# Patient Record
Sex: FEMALE | Race: BLACK OR AFRICAN AMERICAN | NOT HISPANIC OR LATINO | URBAN - METROPOLITAN AREA
[De-identification: names, ages, dates, MRNs, and addresses within clinical notes are randomized per-mention and may not be internally consistent; named-entity substitution may affect disease eponyms.]

---

## 2017-03-20 ENCOUNTER — OUTPATIENT (OUTPATIENT)
Dept: OUTPATIENT SERVICES | Facility: HOSPITAL | Age: 42
LOS: 1 days | End: 2017-03-20
Payer: COMMERCIAL

## 2017-03-20 ENCOUNTER — APPOINTMENT (OUTPATIENT)
Dept: NEUROLOGY | Facility: CLINIC | Age: 42
End: 2017-03-20

## 2017-03-20 PROCEDURE — 70553 MRI BRAIN STEM W/O & W/DYE: CPT | Mod: 26

## 2017-03-20 PROCEDURE — A9585: CPT

## 2017-03-20 PROCEDURE — 70553 MRI BRAIN STEM W/O & W/DYE: CPT

## 2017-03-29 ENCOUNTER — EMERGENCY (EMERGENCY)
Facility: HOSPITAL | Age: 42
LOS: 1 days | Discharge: HOME | End: 2017-03-29
Attending: EMERGENCY MEDICINE
Payer: MEDICAID

## 2017-03-29 VITALS
WEIGHT: 235.89 LBS | RESPIRATION RATE: 18 BRPM | TEMPERATURE: 98 F | HEIGHT: 61 IN | SYSTOLIC BLOOD PRESSURE: 134 MMHG | HEART RATE: 61 BPM | OXYGEN SATURATION: 100 % | DIASTOLIC BLOOD PRESSURE: 87 MMHG

## 2017-03-29 DIAGNOSIS — I10 ESSENTIAL (PRIMARY) HYPERTENSION: ICD-10-CM
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
## 2017-03-29 DIAGNOSIS — Z22.322 CARRIER OR SUSPECTED CARRIER OF METHICILLIN RESISTANT STAPHYLOCOCCUS AUREUS: ICD-10-CM

## 2017-03-29 DIAGNOSIS — S61.219A LACERATION WITHOUT FOREIGN BODY OF UNSPECIFIED FINGER WITHOUT DAMAGE TO NAIL, INITIAL ENCOUNTER: ICD-10-CM
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
## 2017-03-29 DIAGNOSIS — J45.909 UNSPECIFIED ASTHMA, UNCOMPLICATED: ICD-10-CM

## 2017-03-29 DIAGNOSIS — I82.90 ACUTE EMBOLISM AND THROMBOSIS OF UNSPECIFIED VEIN: ICD-10-CM

## 2017-03-29 PROCEDURE — 99284 EMERGENCY DEPT VISIT MOD MDM: CPT

## 2017-03-29 PROCEDURE — 99283 EMERGENCY DEPT VISIT LOW MDM: CPT | Mod: 25

## 2017-03-29 PROCEDURE — 90471 IMMUNIZATION ADMIN: CPT

## 2017-03-29 PROCEDURE — 90715 TDAP VACCINE 7 YRS/> IM: CPT

## 2017-03-29 RX ORDER — TETANUS TOXOID, REDUCED DIPHTHERIA TOXOID AND ACELLULAR PERTUSSIS VACCINE, ADSORBED 5; 2.5; 8; 8; 2.5 [IU]/.5ML; [IU]/.5ML; UG/.5ML; UG/.5ML; UG/.5ML
0.5 SUSPENSION INTRAMUSCULAR ONCE
Qty: 0 | Refills: 0 | Status: COMPLETED | OUTPATIENT
Start: 2017-03-29 | End: 2017-03-29

## 2017-03-29 RX ADMIN — TETANUS TOXOID, REDUCED DIPHTHERIA TOXOID AND ACELLULAR PERTUSSIS VACCINE, ADSORBED 0.5 MILLILITER(S): 5; 2.5; 8; 8; 2.5 SUSPENSION INTRAMUSCULAR at 21:25

## 2017-03-29 NOTE — ED PROVIDER NOTE - PMH
Asthma  required hospital admission in the past.  Does not take medications at home.  Hypertension    MRSA (methicillin resistant Staphylococcus aureus)    NSTEMI (non-ST elevated myocardial infarction)  October 2014, treated at [facility].  Cardiac cath performed.  Thrombus

## 2017-03-29 NOTE — ED PROVIDER NOTE - PROGRESS NOTE DETAILS
Explained to patient wound care and need to follow up with PMD in 2-3 days. Pt is well appearing walking with steady gait, stable for discharge and follow up without fail with medical doctor. I had a detailed discussion with the patient and/or guardian regarding the historical points, exam findings, and any diagnostic results supporting the discharge diagnosis. Pt educated on care and need for follow up. Strict return instructions and red flag signs and symptoms discussed with patient. Questions answered. Pt shows understanding of discharge information and agrees to follow.

## 2017-03-29 NOTE — ED PROVIDER NOTE - ATTENDING CONTRIBUTION TO CARE
40 yo F sustained left 3rd finger laceration 4 days ago.  left 3rd digit distal phalanx volar side 2 cm x 1 cm "V" shaped laceration with skin flap  distal radial and ulnar pulses intact, cap refill < 2 seconds, full range of motion of arm +elbow flexion/extension/supination and pronation intact, +flexion and extension of all digits at DIP and PIP. No active bleeding.   Pt is well appearing walking with normal gait, stable for discharge and follow up with medical doctor. Pt educated on care and need for follow up. Discussed anticipatory guidance and return precautions. Questions answered. I had a detailed discussion with the patient and/or guardian regarding the historical points, exam findings, and any diagnostic results supporting the discharge diagnosis.   Pt is neurovascularly intact and  clean dressing applied.

## 2017-03-29 NOTE — ED ADULT TRIAGE NOTE - CHIEF COMPLAINT QUOTE
patient reports cutting honeydew last sunday and accidentally cut her L middle finger. Patient unknown tdap

## 2017-03-29 NOTE — ED PROVIDER NOTE - PHYSICAL EXAMINATION
left hand 3rd digit distal phalanx volar side 2 cm x 1 cm "V" shaped laceration with skin flap. Well-demarcated edges, no erythema/swelling/streaking. 2nd digit distal phalanx volar surface 3 mm skin avulsion.

## 2017-03-29 NOTE — ED PROVIDER NOTE - MEDICAL DECISION MAKING DETAILS
41 year-old female, presents with laceration to left hand x 4 days ago. No signs of infection. Unable to repair lac due to timing of injury. Patient will need to follow up with PMD for follow up.

## 2017-03-29 NOTE — ED PROVIDER NOTE - OBJECTIVE STATEMENT
41 year-old female, history of left ventricular thrombus s/p open heart surgery on Warfarin, seizure, presents with cc left hand 2nd and 3rd digits laceration 4 days ago. Reports that was slicing fruit and accidently had fingers. Presents today to make sure "everything is OK". Denies fever, chills, drainage, bleeding, redness, swelling or any other complaints.

## 2017-03-29 NOTE — ED ADULT NURSE NOTE - OBJECTIVE STATEMENT
Pt a+ox3 w/ c/o L 2nd digit avulsion and L 3rd digit laceration s/p cut self while cutting fruit 3 days ago, no bleeding or neurological deficit noted on assessment, 5/10 pain scale.

## 2017-05-25 ENCOUNTER — RESULT REVIEW (OUTPATIENT)
Age: 42
End: 2017-05-25

## 2018-01-24 ENCOUNTER — RESULT REVIEW (OUTPATIENT)
Age: 43
End: 2018-01-24

## 2019-06-12 ENCOUNTER — INPATIENT (INPATIENT)
Facility: HOSPITAL | Age: 44
LOS: 5 days | Discharge: HOME | End: 2019-06-18
Attending: HOSPITALIST | Admitting: HOSPITALIST
Payer: SELF-PAY

## 2019-06-12 VITALS — OXYGEN SATURATION: 97 % | HEART RATE: 108 BPM

## 2019-06-12 DIAGNOSIS — G40.909 EPILEPSY, UNSPECIFIED, NOT INTRACTABLE, WITHOUT STATUS EPILEPTICUS: ICD-10-CM

## 2019-06-12 LAB
ALBUMIN SERPL ELPH-MCNC: 4.7 G/DL — SIGNIFICANT CHANGE UP (ref 3.3–5)
ALP SERPL-CCNC: 87 U/L — SIGNIFICANT CHANGE UP (ref 40–120)
ALT FLD-CCNC: 18 U/L — SIGNIFICANT CHANGE UP (ref 4–33)
AMPHET UR-MCNC: NEGATIVE — SIGNIFICANT CHANGE UP
ANION GAP SERPL CALC-SCNC: 34 MMO/L — HIGH (ref 7–14)
ANISOCYTOSIS BLD QL: SLIGHT — SIGNIFICANT CHANGE UP
APAP SERPL-MCNC: < 15 UG/ML — LOW (ref 15–25)
APTT BLD: 27.9 SEC — SIGNIFICANT CHANGE UP (ref 27.5–36.3)
AST SERPL-CCNC: 30 U/L — SIGNIFICANT CHANGE UP (ref 4–32)
BARBITURATES UR SCN-MCNC: NEGATIVE — SIGNIFICANT CHANGE UP
BASE EXCESS BLDA CALC-SCNC: -12.8 MMOL/L — SIGNIFICANT CHANGE UP
BASE EXCESS BLDA CALC-SCNC: -8.4 MMOL/L — SIGNIFICANT CHANGE UP
BASE EXCESS BLDV CALC-SCNC: -22.7 MMOL/L — SIGNIFICANT CHANGE UP
BASOPHILS # BLD AUTO: 0.03 K/UL — SIGNIFICANT CHANGE UP (ref 0–0.2)
BASOPHILS NFR BLD AUTO: 0.3 % — SIGNIFICANT CHANGE UP (ref 0–2)
BASOPHILS NFR SPEC: 0 % — SIGNIFICANT CHANGE UP (ref 0–2)
BILIRUB SERPL-MCNC: < 0.2 MG/DL — LOW (ref 0.2–1.2)
BLASTS # FLD: 0 % — SIGNIFICANT CHANGE UP (ref 0–0)
BLD GP AB SCN SERPL QL: NEGATIVE — SIGNIFICANT CHANGE UP
BLOOD GAS VENOUS - CREATININE: 1.18 MG/DL — SIGNIFICANT CHANGE UP (ref 0.5–1.3)
BLOOD GAS VENOUS - FIO2: 20 — SIGNIFICANT CHANGE UP
BUN SERPL-MCNC: 13 MG/DL — SIGNIFICANT CHANGE UP (ref 7–23)
CALCIUM SERPL-MCNC: 10.6 MG/DL — HIGH (ref 8.4–10.5)
CHLORIDE BLDA-SCNC: 110 MMOL/L — HIGH (ref 96–108)
CHLORIDE BLDA-SCNC: 114 MMOL/L — HIGH (ref 96–108)
CHLORIDE BLDV-SCNC: 113 MMOL/L — HIGH (ref 96–108)
CHLORIDE SERPL-SCNC: 99 MMOL/L — SIGNIFICANT CHANGE UP (ref 98–107)
CK SERPL-CCNC: 114 U/L — SIGNIFICANT CHANGE UP (ref 25–170)
CO2 SERPL-SCNC: < 5 MMOL/L — CRITICAL LOW (ref 22–31)
COCAINE METAB.OTHER UR-MCNC: NEGATIVE — SIGNIFICANT CHANGE UP
CREAT SERPL-MCNC: 1.08 MG/DL — SIGNIFICANT CHANGE UP (ref 0.5–1.3)
EOSINOPHIL # BLD AUTO: 0.18 K/UL — SIGNIFICANT CHANGE UP (ref 0–0.5)
EOSINOPHIL NFR BLD AUTO: 2 % — SIGNIFICANT CHANGE UP (ref 0–6)
EOSINOPHIL NFR FLD: 1.8 % — SIGNIFICANT CHANGE UP (ref 0–6)
ETHANOL BLD-MCNC: < 10 MG/DL — SIGNIFICANT CHANGE UP
GAS PNL BLDV: 135 MMOL/L — LOW (ref 136–146)
GIANT PLATELETS BLD QL SMEAR: PRESENT — SIGNIFICANT CHANGE UP
GLUCOSE BLDA-MCNC: 137 MG/DL — HIGH (ref 70–99)
GLUCOSE BLDA-MCNC: 183 MG/DL — HIGH (ref 70–99)
GLUCOSE BLDV-MCNC: 201 MG/DL — HIGH (ref 70–99)
GLUCOSE SERPL-MCNC: 206 MG/DL — HIGH (ref 70–99)
HCG SERPL-ACNC: < 5 MIU/ML — SIGNIFICANT CHANGE UP
HCG SERPL-ACNC: < 5 MIU/ML — SIGNIFICANT CHANGE UP
HCO3 BLDA-SCNC: 13 MMOL/L — LOW (ref 22–26)
HCO3 BLDA-SCNC: 17 MMOL/L — LOW (ref 22–26)
HCO3 BLDV-SCNC: 6 MMOL/L — LOW (ref 20–27)
HCT VFR BLD CALC: 53.6 % — HIGH (ref 34.5–45)
HCT VFR BLDA CALC: 41.7 % — SIGNIFICANT CHANGE UP (ref 34.5–46.5)
HCT VFR BLDA CALC: 45.2 % — SIGNIFICANT CHANGE UP (ref 34.5–46.5)
HCT VFR BLDV CALC: 47.8 % — HIGH (ref 34.5–45)
HGB BLD-MCNC: 15.2 G/DL — SIGNIFICANT CHANGE UP (ref 11.5–15.5)
HGB BLDA-MCNC: 13.6 G/DL — SIGNIFICANT CHANGE UP (ref 11.5–15.5)
HGB BLDA-MCNC: 14.8 G/DL — SIGNIFICANT CHANGE UP (ref 11.5–15.5)
HGB BLDV-MCNC: 15.6 G/DL — HIGH (ref 11.5–15.5)
IMM GRANULOCYTES NFR BLD AUTO: 0.4 % — SIGNIFICANT CHANGE UP (ref 0–1.5)
INR BLD: 0.97 — SIGNIFICANT CHANGE UP (ref 0.88–1.17)
LACTATE BLDA-SCNC: 2.5 MMOL/L — HIGH (ref 0.5–2)
LACTATE BLDA-SCNC: 7.3 MMOL/L — CRITICAL HIGH (ref 0.5–2)
LACTATE BLDV-MCNC: 21 MMOL/L — CRITICAL HIGH (ref 0.5–2)
LIDOCAIN IGE QN: 124.3 U/L — HIGH (ref 7–60)
LYMPHOCYTES # BLD AUTO: 6.52 K/UL — HIGH (ref 1–3.3)
LYMPHOCYTES # BLD AUTO: 71.7 % — HIGH (ref 13–44)
LYMPHOCYTES NFR SPEC AUTO: 69.9 % — HIGH (ref 13–44)
MACROCYTES BLD QL: SLIGHT — SIGNIFICANT CHANGE UP
MCHC RBC-ENTMCNC: 24.9 PG — LOW (ref 27–34)
MCHC RBC-ENTMCNC: 28.4 % — LOW (ref 32–36)
MCV RBC AUTO: 87.7 FL — SIGNIFICANT CHANGE UP (ref 80–100)
METAMYELOCYTES # FLD: 0.9 % — SIGNIFICANT CHANGE UP (ref 0–1)
METHADONE UR-MCNC: NEGATIVE — SIGNIFICANT CHANGE UP
MONOCYTES # BLD AUTO: 0.27 K/UL — SIGNIFICANT CHANGE UP (ref 0–0.9)
MONOCYTES NFR BLD AUTO: 3 % — SIGNIFICANT CHANGE UP (ref 2–14)
MONOCYTES NFR BLD: 4.4 % — SIGNIFICANT CHANGE UP (ref 2–9)
MYELOCYTES NFR BLD: 0 % — SIGNIFICANT CHANGE UP (ref 0–0)
NEUTROPHIL AB SER-ACNC: 17.7 % — LOW (ref 43–77)
NEUTROPHILS # BLD AUTO: 2.05 K/UL — SIGNIFICANT CHANGE UP (ref 1.8–7.4)
NEUTROPHILS NFR BLD AUTO: 22.6 % — LOW (ref 43–77)
NEUTS BAND # BLD: 0 % — SIGNIFICANT CHANGE UP (ref 0–6)
NRBC # FLD: 0 K/UL — SIGNIFICANT CHANGE UP (ref 0–0)
NT-PROBNP SERPL-SCNC: 486.6 PG/ML — SIGNIFICANT CHANGE UP
OPIATES UR-MCNC: NEGATIVE — SIGNIFICANT CHANGE UP
OTHER - HEMATOLOGY %: 0 — SIGNIFICANT CHANGE UP
OXYCODONE UR-MCNC: NEGATIVE — SIGNIFICANT CHANGE UP
PCO2 BLDA: 44 MMHG — SIGNIFICANT CHANGE UP (ref 32–48)
PCO2 BLDA: 61 MMHG — HIGH (ref 32–48)
PCO2 BLDV: 63 MMHG — HIGH (ref 41–51)
PCP UR-MCNC: NEGATIVE — SIGNIFICANT CHANGE UP
PH BLDA: 7.05 PH — CRITICAL LOW (ref 7.35–7.45)
PH BLDA: 7.23 PH — LOW (ref 7.35–7.45)
PH BLDV: < 6.81 PH — CRITICAL LOW (ref 7.32–7.43)
PLATELET # BLD AUTO: 271 K/UL — SIGNIFICANT CHANGE UP (ref 150–400)
PLATELET COUNT - ESTIMATE: NORMAL — SIGNIFICANT CHANGE UP
PMV BLD: 13.9 FL — HIGH (ref 7–13)
PO2 BLDA: 126 MMHG — HIGH (ref 83–108)
PO2 BLDA: 132 MMHG — HIGH (ref 83–108)
PO2 BLDV: 56 MMHG — HIGH (ref 35–40)
POIKILOCYTOSIS BLD QL AUTO: SLIGHT — SIGNIFICANT CHANGE UP
POLYCHROMASIA BLD QL SMEAR: SLIGHT — SIGNIFICANT CHANGE UP
POTASSIUM BLDA-SCNC: 5 MMOL/L — HIGH (ref 3.4–4.5)
POTASSIUM BLDA-SCNC: 5.4 MMOL/L — HIGH (ref 3.4–4.5)
POTASSIUM BLDV-SCNC: 5.3 MMOL/L — HIGH (ref 3.4–4.5)
POTASSIUM SERPL-MCNC: 4.8 MMOL/L — SIGNIFICANT CHANGE UP (ref 3.5–5.3)
POTASSIUM SERPL-SCNC: 4.8 MMOL/L — SIGNIFICANT CHANGE UP (ref 3.5–5.3)
PROMYELOCYTES # FLD: 0 % — SIGNIFICANT CHANGE UP (ref 0–0)
PROT SERPL-MCNC: 8.1 G/DL — SIGNIFICANT CHANGE UP (ref 6–8.3)
PROTHROM AB SERPL-ACNC: 11.1 SEC — SIGNIFICANT CHANGE UP (ref 9.8–13.1)
RBC # BLD: 6.11 M/UL — HIGH (ref 3.8–5.2)
RBC # FLD: 17.4 % — HIGH (ref 10.3–14.5)
RH IG SCN BLD-IMP: POSITIVE — SIGNIFICANT CHANGE UP
SALICYLATES SERPL-MCNC: < 5 MG/DL — LOW (ref 15–30)
SAO2 % BLDA: 95.7 % — SIGNIFICANT CHANGE UP (ref 95–99)
SAO2 % BLDA: 97.7 % — SIGNIFICANT CHANGE UP (ref 95–99)
SAO2 % BLDV: 53.7 % — LOW (ref 60–85)
SMUDGE CELLS # BLD: PRESENT — SIGNIFICANT CHANGE UP
SODIUM BLDA-SCNC: 130 MMOL/L — LOW (ref 136–146)
SODIUM BLDA-SCNC: 133 MMOL/L — LOW (ref 136–146)
SODIUM SERPL-SCNC: 138 MMOL/L — SIGNIFICANT CHANGE UP (ref 135–145)
TROPONIN T, HIGH SENSITIVITY: 26 NG/L — SIGNIFICANT CHANGE UP (ref ?–14)
VARIANT LYMPHS # BLD: 5.3 % — SIGNIFICANT CHANGE UP
WBC # BLD: 9.09 K/UL — SIGNIFICANT CHANGE UP (ref 3.8–10.5)
WBC # FLD AUTO: 9.09 K/UL — SIGNIFICANT CHANGE UP (ref 3.8–10.5)

## 2019-06-12 PROCEDURE — 71045 X-RAY EXAM CHEST 1 VIEW: CPT | Mod: 26

## 2019-06-12 PROCEDURE — 74177 CT ABD & PELVIS W/CONTRAST: CPT | Mod: 26

## 2019-06-12 PROCEDURE — 71260 CT THORAX DX C+: CPT | Mod: 26

## 2019-06-12 PROCEDURE — 76604 US EXAM CHEST: CPT | Mod: 26,GC

## 2019-06-12 PROCEDURE — 72125 CT NECK SPINE W/O DYE: CPT | Mod: 26

## 2019-06-12 PROCEDURE — 93308 TTE F-UP OR LMTD: CPT | Mod: 26,GC

## 2019-06-12 PROCEDURE — 70450 CT HEAD/BRAIN W/O DYE: CPT | Mod: 26

## 2019-06-12 PROCEDURE — 99291 CRITICAL CARE FIRST HOUR: CPT

## 2019-06-12 RX ORDER — MIDAZOLAM HYDROCHLORIDE 1 MG/ML
0.02 INJECTION, SOLUTION INTRAMUSCULAR; INTRAVENOUS
Qty: 100 | Refills: 0 | Status: DISCONTINUED | OUTPATIENT
Start: 2019-06-12 | End: 2019-06-14

## 2019-06-12 RX ORDER — FENTANYL CITRATE 50 UG/ML
1.5 INJECTION INTRAVENOUS
Qty: 2500 | Refills: 0 | Status: DISCONTINUED | OUTPATIENT
Start: 2019-06-12 | End: 2019-06-14

## 2019-06-12 RX ORDER — LEVETIRACETAM 250 MG/1
1000 TABLET, FILM COATED ORAL ONCE
Refills: 0 | Status: COMPLETED | OUTPATIENT
Start: 2019-06-12 | End: 2019-06-12

## 2019-06-12 RX ORDER — MIDAZOLAM HYDROCHLORIDE 1 MG/ML
4 INJECTION, SOLUTION INTRAMUSCULAR; INTRAVENOUS ONCE
Refills: 0 | Status: DISCONTINUED | OUTPATIENT
Start: 2019-06-12 | End: 2019-06-12

## 2019-06-12 RX ORDER — HEPARIN SODIUM 5000 [USP'U]/ML
5000 INJECTION INTRAVENOUS; SUBCUTANEOUS EVERY 8 HOURS
Refills: 0 | Status: DISCONTINUED | OUTPATIENT
Start: 2019-06-12 | End: 2019-06-13

## 2019-06-12 RX ORDER — FENTANYL CITRATE 50 UG/ML
100 INJECTION INTRAVENOUS ONCE
Refills: 0 | Status: DISCONTINUED | OUTPATIENT
Start: 2019-06-12 | End: 2019-06-12

## 2019-06-12 RX ORDER — MIDAZOLAM HYDROCHLORIDE 1 MG/ML
0.02 INJECTION, SOLUTION INTRAMUSCULAR; INTRAVENOUS
Qty: 100 | Refills: 0 | Status: DISCONTINUED | OUTPATIENT
Start: 2019-06-12 | End: 2019-06-12

## 2019-06-12 RX ORDER — PROPOFOL 10 MG/ML
20 INJECTION, EMULSION INTRAVENOUS
Qty: 1000 | Refills: 0 | Status: DISCONTINUED | OUTPATIENT
Start: 2019-06-12 | End: 2019-06-12

## 2019-06-12 RX ORDER — ROCURONIUM BROMIDE 10 MG/ML
100 VIAL (ML) INTRAVENOUS ONCE
Refills: 0 | Status: COMPLETED | OUTPATIENT
Start: 2019-06-12 | End: 2019-06-12

## 2019-06-12 RX ORDER — RAMIPRIL 5 MG
1 CAPSULE ORAL
Qty: 0 | Refills: 0 | DISCHARGE

## 2019-06-12 RX ORDER — CHLORHEXIDINE GLUCONATE 213 G/1000ML
1 SOLUTION TOPICAL
Refills: 0 | Status: DISCONTINUED | OUTPATIENT
Start: 2019-06-12 | End: 2019-06-14

## 2019-06-12 RX ORDER — PROPOFOL 10 MG/ML
18.25 INJECTION, EMULSION INTRAVENOUS
Qty: 1000 | Refills: 0 | Status: DISCONTINUED | OUTPATIENT
Start: 2019-06-12 | End: 2019-06-13

## 2019-06-12 RX ORDER — MIDAZOLAM HYDROCHLORIDE 1 MG/ML
2 INJECTION, SOLUTION INTRAMUSCULAR; INTRAVENOUS ONCE
Refills: 0 | Status: DISCONTINUED | OUTPATIENT
Start: 2019-06-12 | End: 2019-06-12

## 2019-06-12 RX ORDER — ETOMIDATE 2 MG/ML
20 INJECTION INTRAVENOUS ONCE
Refills: 0 | Status: COMPLETED | OUTPATIENT
Start: 2019-06-12 | End: 2019-06-12

## 2019-06-12 RX ADMIN — FENTANYL CITRATE 100 MICROGRAM(S): 50 INJECTION INTRAVENOUS at 18:30

## 2019-06-12 RX ADMIN — LEVETIRACETAM 400 MILLIGRAM(S): 250 TABLET, FILM COATED ORAL at 20:16

## 2019-06-12 RX ADMIN — FENTANYL CITRATE 100 MICROGRAM(S): 50 INJECTION INTRAVENOUS at 23:00

## 2019-06-12 RX ADMIN — ETOMIDATE 20 MILLIGRAM(S): 2 INJECTION INTRAVENOUS at 18:30

## 2019-06-12 RX ADMIN — MIDAZOLAM HYDROCHLORIDE 2.19 MG/KG/HR: 1 INJECTION, SOLUTION INTRAMUSCULAR; INTRAVENOUS at 22:51

## 2019-06-12 RX ADMIN — HEPARIN SODIUM 5000 UNIT(S): 5000 INJECTION INTRAVENOUS; SUBCUTANEOUS at 23:19

## 2019-06-12 RX ADMIN — Medication 100 MILLIGRAM(S): at 18:30

## 2019-06-12 RX ADMIN — MIDAZOLAM HYDROCHLORIDE 4 MILLIGRAM(S): 1 INJECTION, SOLUTION INTRAMUSCULAR; INTRAVENOUS at 22:12

## 2019-06-12 RX ADMIN — PROPOFOL 12 MICROGRAM(S)/KG/MIN: 10 INJECTION, EMULSION INTRAVENOUS at 20:01

## 2019-06-12 RX ADMIN — PROPOFOL 12 MICROGRAM(S)/KG/MIN: 10 INJECTION, EMULSION INTRAVENOUS at 22:53

## 2019-06-12 RX ADMIN — MIDAZOLAM HYDROCHLORIDE 4 MILLIGRAM(S): 1 INJECTION, SOLUTION INTRAMUSCULAR; INTRAVENOUS at 23:00

## 2019-06-12 RX ADMIN — MIDAZOLAM HYDROCHLORIDE 2 MILLIGRAM(S): 1 INJECTION, SOLUTION INTRAMUSCULAR; INTRAVENOUS at 22:30

## 2019-06-12 NOTE — H&P ADULT - NSICDXPASTMEDICALHX_GEN_ALL_CORE_FT
PAST MEDICAL HISTORY:  Asthma required hospital admission in the past.  Does not take medications at home.    Hypertension     NSTEMI (non-ST elevated myocardial infarction) October 2014, treated at [facility].  Cardiac cath performed.    Seizure disorder     Thrombus LV thrombus s/p emergent thrombectomy
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
## 2019-06-12 NOTE — H&P ADULT - NSHPLABSRESULTS_GEN_ALL_CORE
The Labs were reviewed by me   The Radiology was reviewed by me    EKG tracing reviewed by me    06-12    138  |  99  |  13  ----------------------------<  206<H>  4.8   |  < 5<LL>  |  1.08    Ca    10.6<H>      12 Jun 2019 18:00    TPro  8.1  /  Alb  4.7  /  TBili  < 0.2<L>  /  DBili  x   /  AST  30  /  ALT  18  /  AlkPhos  87  06-12          PT/INR - ( 12 Jun 2019 19:18 )   PT: 11.1 SEC;   INR: 0.97          PTT - ( 12 Jun 2019 19:18 )  PTT:27.9 SEC                ABG - ( 12 Jun 2019 20:55 )  pH, Arterial: 7.23  pH, Blood: x     /  pCO2: 44    /  pO2: 132   / HCO3: 17    / Base Excess: -8.4  /  SaO2: 97.7                                    15.2   9.09  )-----------( 271      ( 12 Jun 2019 18:00 )             53.6     CAPILLARY BLOOD GLUCOSE

## 2019-06-12 NOTE — PATIENT PROFILE ADULT - LIVES WITH
patient unable to answer, intubated and sedated adult child(shlomo)/patient unable to answer, intubated and sedated
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
## 2019-06-12 NOTE — PATIENT PROFILE ADULT - NSPROGENDIFFINTUB_GEN_A_NUR
patient unable to answer, intubated and sedated patient unable to answer, intubated and sedated, daughter has never seen her intubated

## 2019-06-12 NOTE — ED PROVIDER NOTE - PHYSICAL EXAMINATION
***GEN - agitated, combative  ***HEAD - NC/AT  ***EYES/NOSE - PEERL,mucous membranes moist, no discharge   ***THROAT: Oral cavity and pharynx normal. scant blood on lower lip  ***NECK: supple, c-collar in place  ***PULMONARY - coarse breath sounds diffusely, equal bilaterally  ***CARDIAC- s1s2, RRR, no murmur  ***ABDOMEN - +BS, ND, NT, soft, no guarding, no rebound, no organomegaly  ***BACK - no CVA tenderness, Normal  spine  ***EXTREMITIES - symmetric pulses, 2+ dp, capillary refill < 2 seconds, no clubbing, no cyanosis, no edema   ***SKIN - warm, diaphoretic, intact, no rash or bruising   ***NEUROLOGIC - aox1, responding intermittently to questions, moving all extremities equally, moaning

## 2019-06-12 NOTE — H&P ADULT - NSICDXFAMILYHX_GEN_ALL_CORE_FT
FAMILY HISTORY:  Family history of coronary artery disease in father    Uncle  Still living? Unknown  Family history of myocardial infarction at age less than 60, Age at diagnosis: Age Unknown

## 2019-06-12 NOTE — ED ADULT NURSE REASSESSMENT NOTE - NS ED NURSE REASSESS COMMENT FT1
Pt intubated at this time, on Propofol drip, tolerating intubation well. Respirations even and unlabored. TARIQ Arce at bedside closely monitoring patient, MD at bedside, will continue to monitor.

## 2019-06-12 NOTE — PATIENT PROFILE ADULT - NSPROGENBLOODRESTRICT_GEN_A_NUR
patient unable to answer, intubated and sedated patient unable to answer, intubated and sedated/none

## 2019-06-12 NOTE — H&P ADULT - NSHPSOCIALHISTORY_GEN_ALL_CORE
Smoking: current smoker  Alcohol: Smoking: current smoker  Employment: started working as a  in May 2019  Lives w/ daughter at home
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
## 2019-06-12 NOTE — ED PROVIDER NOTE - CRITICAL CARE PROVIDED
interpretation of diagnostic studies/consultation with other physicians/consult w/ pt's family directly relating to pts condition/additional history taking/direct patient care (not related to procedure)/documentation/telephone consultation with the patient's family
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
## 2019-06-12 NOTE — ED PROVIDER NOTE - PMH
Asthma  required hospital admission in the past.  Does not take medications at home.  Hypertension    MRSA (methicillin resistant Staphylococcus aureus)    NSTEMI (non-ST elevated myocardial infarction)  October 2014, treated at [facility].  Cardiac cath performed.  Thrombus
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
## 2019-06-12 NOTE — ED ADULT NURSE NOTE - PMH
Asthma  required hospital admission in the past.  Does not take medications at home.  Hypertension    MRSA (methicillin resistant Staphylococcus aureus)    NSTEMI (non-ST elevated myocardial infarction)  October 2014, treated at [facility].  Cardiac cath performed.  Thrombus

## 2019-06-12 NOTE — PATIENT PROFILE ADULT - INFORMATION PROVIDED TO:
patient unable to answer, intubated and sedated patient unable to answer, intubated and sedated/patient representative

## 2019-06-12 NOTE — ED PROVIDER NOTE - ATTENDING CONTRIBUTION TO CARE
seizures since 2016  Keppra 1000 BID  carvedilol 12.5mg  used to take coumadin - ?still taking  ramipril   amlodipine      PMD: Dr. Pura Russell  Neurology: Dr. Ayesha Marks  Pharmacy: [facility], [id number]    Daughter: Tierra [phone] (lives with patient)      Patient reported feeling "off" since earlier today - +nausea, dizzy, lightheaded, +vomit

## 2019-06-12 NOTE — PATIENT PROFILE ADULT - NSPROGENANESREACTION_GEN_A_NUR
patient unable to answer, intubated and sedated no previous reaction/patient unable to answer, intubated and sedated

## 2019-06-12 NOTE — H&P ADULT - NSHPREVIEWOFSYSTEMS_GEN_ALL_CORE
CONSTITUTIONAL:  No weight loss, fever, chills, weakness or fatigue.  HEENT:  Eyes:  No visual loss, blurred vision, double vision or yellow sclerae. Ears, Nose, Throat:  No hearing loss, sneezing, congestion, runny nose or sore throat.  SKIN:  No rash or itching.  CARDIOVASCULAR:  No chest pain, chest pressure or chest discomfort. No palpitations or edema.  RESPIRATORY:  No shortness of breath, cough or sputum.  GASTROINTESTINAL:  No anorexia, nausea, vomiting or diarrhea. No abdominal pain or blood.  GENITOURINARY:  Denies hematuria, dysuria.   NEUROLOGICAL:  No headache, dizziness, syncope, paralysis, ataxia, numbness or tingling in the extremities. No change in bowel or bladder control.  MUSCULOSKELETAL:  No muscle, back pain, joint pain or stiffness.  HEMATOLOGIC:  No anemia, bleeding or bruising.  LYMPHATICS:  No enlarged nodes. No history of splenectomy.  PSYCHIATRIC:  No history of depression or anxiety.  ENDOCRINOLOGIC:  No reports of sweating, cold or heat intolerance. No polyuria or polydipsia.  ALLERGIES:  No history of asthma, hives, eczema or rhinitis. Unable to assess as patient is intubated
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
## 2019-06-12 NOTE — H&P ADULT - NSHPPHYSICALEXAM_GEN_ALL_CORE
PHYSICAL EXAM:  GENERAL: NAD, well-developed  HEAD:  Atraumatic, Normocephalic  EYES: EOMI, PERRLA, conjunctiva and sclera clear  NECK: Supple, No JVD  CHEST/LUNG: Clear to auscultation bilaterally; No wheeze  HEART: Regular rate and rhythm; No murmurs, rubs, or gallops  ABDOMEN: Soft, Nontender, Nondistended; Bowel sounds present  EXTREMITIES:  2+ Peripheral Pulses, No clubbing, cyanosis, or edema  PSYCH: AAOx3  NEUROLOGY: non-focal  SKIN: No rashes or lesions PHYSICAL EXAM:  GENERAL: intubated, sedated  HEAD:  Atraumatic, Normocephalic, c-collar in place  EYES: EOMI, PERRLA, conjunctiva and sclera clear, pupils constricted but reactive to light b/l   NECK: Supple, No JVD  CHEST/LUNG: surgical scar above sternum, clear to auscultation bilaterally; No wheeze  HEART: Regular rate and rhythm; No murmurs, rubs, or gallops  ABDOMEN: Soft, Nontender, Nondistended; Bowel sounds present  EXTREMITIES:  2+ Peripheral Pulses, No clubbing, cyanosis, or edema  PSYCH: AAOx0  NEUROLOGY: non-focal  SKIN: No rashes or lesions

## 2019-06-13 LAB
ALBUMIN SERPL ELPH-MCNC: 3.6 G/DL — SIGNIFICANT CHANGE UP (ref 3.3–5)
ALP SERPL-CCNC: 50 U/L — SIGNIFICANT CHANGE UP (ref 40–120)
ALT FLD-CCNC: 19 U/L — SIGNIFICANT CHANGE UP (ref 4–33)
ANION GAP SERPL CALC-SCNC: 13 MMO/L — SIGNIFICANT CHANGE UP (ref 7–14)
ANION GAP SERPL CALC-SCNC: 13 MMO/L — SIGNIFICANT CHANGE UP (ref 7–14)
APPEARANCE UR: SIGNIFICANT CHANGE UP
AST SERPL-CCNC: 41 U/L — HIGH (ref 4–32)
BASE EXCESS BLDA CALC-SCNC: -6.9 MMOL/L — SIGNIFICANT CHANGE UP
BASOPHILS # BLD AUTO: 0.02 K/UL — SIGNIFICANT CHANGE UP (ref 0–0.2)
BASOPHILS NFR BLD AUTO: 0.2 % — SIGNIFICANT CHANGE UP (ref 0–2)
BENZODIAZ UR-MCNC: POSITIVE — SIGNIFICANT CHANGE UP
BILIRUB SERPL-MCNC: 0.3 MG/DL — SIGNIFICANT CHANGE UP (ref 0.2–1.2)
BILIRUB UR-MCNC: NEGATIVE — SIGNIFICANT CHANGE UP
BLOOD GAS ARTERIAL - FIO2: 60 — SIGNIFICANT CHANGE UP
BLOOD UR QL VISUAL: HIGH
BUN SERPL-MCNC: 16 MG/DL — SIGNIFICANT CHANGE UP (ref 7–23)
BUN SERPL-MCNC: 16 MG/DL — SIGNIFICANT CHANGE UP (ref 7–23)
CALCIUM SERPL-MCNC: 8.6 MG/DL — SIGNIFICANT CHANGE UP (ref 8.4–10.5)
CALCIUM SERPL-MCNC: 9.3 MG/DL — SIGNIFICANT CHANGE UP (ref 8.4–10.5)
CANNABINOIDS UR-MCNC: POSITIVE — SIGNIFICANT CHANGE UP
CHLORIDE SERPL-SCNC: 105 MMOL/L — SIGNIFICANT CHANGE UP (ref 98–107)
CHLORIDE SERPL-SCNC: 108 MMOL/L — HIGH (ref 98–107)
CO2 SERPL-SCNC: 16 MMOL/L — LOW (ref 22–31)
CO2 SERPL-SCNC: 19 MMOL/L — LOW (ref 22–31)
COLOR SPEC: SIGNIFICANT CHANGE UP
CREAT SERPL-MCNC: 1.09 MG/DL — SIGNIFICANT CHANGE UP (ref 0.5–1.3)
CREAT SERPL-MCNC: 1.43 MG/DL — HIGH (ref 0.5–1.3)
EOSINOPHIL # BLD AUTO: 0.1 K/UL — SIGNIFICANT CHANGE UP (ref 0–0.5)
EOSINOPHIL NFR BLD AUTO: 0.8 % — SIGNIFICANT CHANGE UP (ref 0–6)
GLUCOSE BLDA-MCNC: 113 MG/DL — HIGH (ref 70–99)
GLUCOSE SERPL-MCNC: 119 MG/DL — HIGH (ref 70–99)
GLUCOSE SERPL-MCNC: 81 MG/DL — SIGNIFICANT CHANGE UP (ref 70–99)
GLUCOSE UR-MCNC: NEGATIVE — SIGNIFICANT CHANGE UP
HCO3 BLDA-SCNC: 19 MMOL/L — LOW (ref 22–26)
HCT VFR BLD CALC: 41.5 % — SIGNIFICANT CHANGE UP (ref 34.5–45)
HCT VFR BLDA CALC: 39.9 % — SIGNIFICANT CHANGE UP (ref 34.5–46.5)
HGB BLD-MCNC: 12.9 G/DL — SIGNIFICANT CHANGE UP (ref 11.5–15.5)
HGB BLDA-MCNC: 13 G/DL — SIGNIFICANT CHANGE UP (ref 11.5–15.5)
IMM GRANULOCYTES NFR BLD AUTO: 0.4 % — SIGNIFICANT CHANGE UP (ref 0–1.5)
KETONES UR-MCNC: NEGATIVE — SIGNIFICANT CHANGE UP
LEUKOCYTE ESTERASE UR-ACNC: NEGATIVE — SIGNIFICANT CHANGE UP
LYMPHOCYTES # BLD AUTO: 17.4 % — SIGNIFICANT CHANGE UP (ref 13–44)
LYMPHOCYTES # BLD AUTO: 2.26 K/UL — SIGNIFICANT CHANGE UP (ref 1–3.3)
MAGNESIUM SERPL-MCNC: 2.2 MG/DL — SIGNIFICANT CHANGE UP (ref 1.6–2.6)
MAGNESIUM SERPL-MCNC: 2.3 MG/DL — SIGNIFICANT CHANGE UP (ref 1.6–2.6)
MCHC RBC-ENTMCNC: 24.6 PG — LOW (ref 27–34)
MCHC RBC-ENTMCNC: 31.1 % — LOW (ref 32–36)
MCV RBC AUTO: 79.2 FL — LOW (ref 80–100)
MONOCYTES # BLD AUTO: 0.97 K/UL — HIGH (ref 0–0.9)
MONOCYTES NFR BLD AUTO: 7.5 % — SIGNIFICANT CHANGE UP (ref 2–14)
NEUTROPHILS # BLD AUTO: 9.6 K/UL — HIGH (ref 1.8–7.4)
NEUTROPHILS NFR BLD AUTO: 73.7 % — SIGNIFICANT CHANGE UP (ref 43–77)
NITRITE UR-MCNC: NEGATIVE — SIGNIFICANT CHANGE UP
NRBC # FLD: 0 K/UL — SIGNIFICANT CHANGE UP (ref 0–0)
PCO2 BLDA: 34 MMHG — SIGNIFICANT CHANGE UP (ref 32–48)
PH BLDA: 7.34 PH — LOW (ref 7.35–7.45)
PH UR: 6 — SIGNIFICANT CHANGE UP (ref 5–8)
PHOSPHATE SERPL-MCNC: 3.1 MG/DL — SIGNIFICANT CHANGE UP (ref 2.5–4.5)
PHOSPHATE SERPL-MCNC: 3.3 MG/DL — SIGNIFICANT CHANGE UP (ref 2.5–4.5)
PLATELET # BLD AUTO: 198 K/UL — SIGNIFICANT CHANGE UP (ref 150–400)
PMV BLD: SIGNIFICANT CHANGE UP FL (ref 7–13)
PO2 BLDA: 207 MMHG — HIGH (ref 83–108)
POTASSIUM BLDA-SCNC: 4.2 MMOL/L — SIGNIFICANT CHANGE UP (ref 3.4–4.5)
POTASSIUM SERPL-MCNC: 4.8 MMOL/L — SIGNIFICANT CHANGE UP (ref 3.5–5.3)
POTASSIUM SERPL-MCNC: 5 MMOL/L — SIGNIFICANT CHANGE UP (ref 3.5–5.3)
POTASSIUM SERPL-SCNC: 4.8 MMOL/L — SIGNIFICANT CHANGE UP (ref 3.5–5.3)
POTASSIUM SERPL-SCNC: 5 MMOL/L — SIGNIFICANT CHANGE UP (ref 3.5–5.3)
PROT SERPL-MCNC: 6 G/DL — SIGNIFICANT CHANGE UP (ref 6–8.3)
PROT UR-MCNC: 20 — SIGNIFICANT CHANGE UP
RBC # BLD: 5.24 M/UL — HIGH (ref 3.8–5.2)
RBC # FLD: 16.4 % — HIGH (ref 10.3–14.5)
SAO2 % BLDA: 99.6 % — HIGH (ref 95–99)
SODIUM BLDA-SCNC: 133 MMOL/L — LOW (ref 136–146)
SODIUM SERPL-SCNC: 134 MMOL/L — LOW (ref 135–145)
SODIUM SERPL-SCNC: 140 MMOL/L — SIGNIFICANT CHANGE UP (ref 135–145)
SP GR SPEC: 1.03 — SIGNIFICANT CHANGE UP (ref 1–1.04)
UROBILINOGEN FLD QL: NORMAL — SIGNIFICANT CHANGE UP
WBC # BLD: 13 K/UL — HIGH (ref 3.8–10.5)
WBC # FLD AUTO: 13 K/UL — HIGH (ref 3.8–10.5)

## 2019-06-13 PROCEDURE — 71045 X-RAY EXAM CHEST 1 VIEW: CPT | Mod: 26,76

## 2019-06-13 PROCEDURE — 99291 CRITICAL CARE FIRST HOUR: CPT | Mod: 25

## 2019-06-13 PROCEDURE — 93308 TTE F-UP OR LMTD: CPT | Mod: 26,GC

## 2019-06-13 PROCEDURE — 36569 INSJ PICC 5 YR+ W/O IMAGING: CPT

## 2019-06-13 PROCEDURE — 95819 EEG AWAKE AND ASLEEP: CPT | Mod: 26

## 2019-06-13 PROCEDURE — 76604 US EXAM CHEST: CPT | Mod: 26,GC

## 2019-06-13 PROCEDURE — 76937 US GUIDE VASCULAR ACCESS: CPT | Mod: 26,59

## 2019-06-13 RX ORDER — DEXMEDETOMIDINE HYDROCHLORIDE IN 0.9% SODIUM CHLORIDE 4 UG/ML
0.2 INJECTION INTRAVENOUS
Qty: 200 | Refills: 0 | Status: DISCONTINUED | OUTPATIENT
Start: 2019-06-13 | End: 2019-06-13

## 2019-06-13 RX ORDER — CHLORHEXIDINE GLUCONATE 213 G/1000ML
15 SOLUTION TOPICAL
Refills: 0 | Status: DISCONTINUED | OUTPATIENT
Start: 2019-06-13 | End: 2019-06-14

## 2019-06-13 RX ORDER — LEVETIRACETAM 250 MG/1
1000 TABLET, FILM COATED ORAL EVERY 12 HOURS
Refills: 0 | Status: DISCONTINUED | OUTPATIENT
Start: 2019-06-13 | End: 2019-06-13

## 2019-06-13 RX ORDER — LEVETIRACETAM 250 MG/1
1000 TABLET, FILM COATED ORAL
Refills: 0 | Status: DISCONTINUED | OUTPATIENT
Start: 2019-06-13 | End: 2019-06-14

## 2019-06-13 RX ORDER — LEVETIRACETAM 250 MG/1
1 TABLET, FILM COATED ORAL
Qty: 0 | Refills: 0 | DISCHARGE

## 2019-06-13 RX ORDER — DEXMEDETOMIDINE HYDROCHLORIDE IN 0.9% SODIUM CHLORIDE 4 UG/ML
0.2 INJECTION INTRAVENOUS
Qty: 200 | Refills: 0 | Status: DISCONTINUED | OUTPATIENT
Start: 2019-06-13 | End: 2019-06-14

## 2019-06-13 RX ORDER — HEPARIN SODIUM 5000 [USP'U]/ML
7500 INJECTION INTRAVENOUS; SUBCUTANEOUS EVERY 8 HOURS
Refills: 0 | Status: DISCONTINUED | OUTPATIENT
Start: 2019-06-13 | End: 2019-06-16

## 2019-06-13 RX ADMIN — HEPARIN SODIUM 5000 UNIT(S): 5000 INJECTION INTRAVENOUS; SUBCUTANEOUS at 06:53

## 2019-06-13 RX ADMIN — LEVETIRACETAM 400 MILLIGRAM(S): 250 TABLET, FILM COATED ORAL at 19:44

## 2019-06-13 RX ADMIN — LEVETIRACETAM 400 MILLIGRAM(S): 250 TABLET, FILM COATED ORAL at 09:01

## 2019-06-13 RX ADMIN — HEPARIN SODIUM 7500 UNIT(S): 5000 INJECTION INTRAVENOUS; SUBCUTANEOUS at 14:00

## 2019-06-13 RX ADMIN — HEPARIN SODIUM 7500 UNIT(S): 5000 INJECTION INTRAVENOUS; SUBCUTANEOUS at 23:00

## 2019-06-13 RX ADMIN — CHLORHEXIDINE GLUCONATE 1 APPLICATION(S): 213 SOLUTION TOPICAL at 09:01

## 2019-06-13 RX ADMIN — DEXMEDETOMIDINE HYDROCHLORIDE IN 0.9% SODIUM CHLORIDE 5.48 MICROGRAM(S)/KG/HR: 4 INJECTION INTRAVENOUS at 23:00

## 2019-06-13 RX ADMIN — FENTANYL CITRATE 5.48 MICROGRAM(S)/KG/HR: 50 INJECTION INTRAVENOUS at 23:40

## 2019-06-13 RX ADMIN — DEXMEDETOMIDINE HYDROCHLORIDE IN 0.9% SODIUM CHLORIDE 5.48 MICROGRAM(S)/KG/HR: 4 INJECTION INTRAVENOUS at 19:45

## 2019-06-13 RX ADMIN — PROPOFOL 12 MICROGRAM(S)/KG/MIN: 10 INJECTION, EMULSION INTRAVENOUS at 00:23

## 2019-06-13 RX ADMIN — MIDAZOLAM HYDROCHLORIDE 2.19 MG/KG/HR: 1 INJECTION, SOLUTION INTRAMUSCULAR; INTRAVENOUS at 00:23

## 2019-06-13 RX ADMIN — MIDAZOLAM HYDROCHLORIDE 2.19 MG/KG/HR: 1 INJECTION, SOLUTION INTRAMUSCULAR; INTRAVENOUS at 19:45

## 2019-06-13 RX ADMIN — Medication 1 APPLICATION(S): at 18:35

## 2019-06-13 RX ADMIN — FENTANYL CITRATE 16.44 MICROGRAM(S)/KG/HR: 50 INJECTION INTRAVENOUS at 19:45

## 2019-06-13 RX ADMIN — Medication 1 APPLICATION(S): at 06:53
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
## 2019-06-13 NOTE — PROGRESS NOTE ADULT - SUBJECTIVE AND OBJECTIVE BOX
INTERVAL HPI/OVERNIGHT EVENTS: Pt intubated in ED, admitted to MICU for further care.     SUBJECTIVE: Patient seen and examined at bedside. Pt intubated/sedated, unable to assess MS.    OBJECTIVE:    VITAL SIGNS:  ICU Vital Signs Last 24 Hrs  T(C): 36.3 (2019 04:00), Max: 37.8 (2019 19:15)  T(F): 97.4 (2019 04:00), Max: 100 (2019 19:15)  HR: 82 (2019 07:55) (69 - 108)  BP: 169/122 (2019 07:50) (123/91 - 169/122)  BP(mean): 136 (2019 07:50) (100 - 136)  ABP: --  ABP(mean): --  RR: 18 (2019 07:55) (16 - 24)  SpO2: 100% (2019 07:55) (97% - 100%)    Mode: AC/ CMV (Assist Control/ Continuous Mandatory Ventilation), RR (machine): 18, TV (machine): 450, FiO2: 40, PEEP: 5, MAP: 10, PIP: 25    06-12 @ 07:01  -  06-13 @ 07:00  --------------------------------------------------------  IN: 411.4 mL / OUT: 930 mL / NET: -518.6 mL      CAPILLARY BLOOD GLUCOSE          PHYSICAL EXAM:  	GENERAL: intubated, sedated  	HEAD:  Atraumatic, Normocephalic, c-collar in place  	EYES: EOMI, PERRLA, conjunctiva and sclera clear, pupils constricted but reactive to light b/l   	NECK: Supple, No JVD  	CHEST/LUNG: surgical scar above sternum, clear to auscultation bilaterally; No wheeze  	HEART: Regular rate and rhythm; No murmurs, rubs, or gallops  	ABDOMEN: Soft, Nontender, Nondistended; Bowel sounds present  	EXTREMITIES:  2+ Peripheral Pulses, No clubbing, cyanosis, or edema  	PSYCH: AAOx0  	NEUROLOGY: non-focal  SKIN: No rashes or lesions    MEDICATIONS:  MEDICATIONS  (STANDING):  chlorhexidine 4% Liquid 1 Application(s) Topical <User Schedule>  fentaNYL   Infusion. 0.5 MICROgram(s)/kG/Hr (5.48 mL/Hr) IV Continuous <Continuous>  heparin  Injectable 5000 Unit(s) SubCutaneous every 8 hours  levETIRAcetam  IVPB 1000 milliGRAM(s) IV Intermittent every 12 hours  midazolam Infusion 0.02 mG/kG/Hr (2.192 mL/Hr) IV Continuous <Continuous>  petrolatum Ophthalmic Ointment 1 Application(s) Both EYES two times a day  propofol Infusion 18.248 MICROgram(s)/kG/Min (12 mL/Hr) IV Continuous <Continuous>    MEDICATIONS  (PRN):      ALLERGIES:  Allergies    No Known Drug Allergies  strawberry (Urticaria; Rash)  tomato (Hives; Rash)    Intolerances        LABS:                        12.9   13.00 )-----------( 198      ( 2019 06:37 )             41.5     06-13    134<L>  |  105  |  16  ----------------------------<  81  5.0   |  16<L>  |  1.43<H>    Ca    8.6      2019 05:15  Phos  3.1     06-13  Mg     2.3     06-13    TPro  6.0  /  Alb  3.6  /  TBili  0.3  /  DBili  x   /  AST  41<H>  /  ALT  19  /  AlkPhos  50  06-13    PT/INR - ( 2019 19:18 )   PT: 11.1 SEC;   INR: 0.97          PTT - ( 2019 19:18 )  PTT:27.9 SEC  Urinalysis Basic - ( 2019 06:15 )    Color: LIGHT YELLOW / Appearance: Lt TURBID / S.029 / pH: 6.0  Gluc: NEGATIVE / Ketone: NEGATIVE  / Bili: NEGATIVE / Urobili: NORMAL   Blood: MODERATE / Protein: 20 / Nitrite: NEGATIVE   Leuk Esterase: NEGATIVE / RBC: x / WBC x   Sq Epi: x / Non Sq Epi: x / Bacteria: x        RADIOLOGY & ADDITIONAL TESTS: Reviewed. INTERVAL HPI/OVERNIGHT EVENTS: Pt intubated in ED, admitted to MICU for further care.     SUBJECTIVE: Patient seen and examined at bedside. Pt intubated/sedated, unable to assess MS.    OBJECTIVE:    VITAL SIGNS:  ICU Vital Signs Last 24 Hrs  T(C): 36.3 (2019 04:00), Max: 37.8 (2019 19:15)  T(F): 97.4 (2019 04:00), Max: 100 (2019 19:15)  HR: 82 (2019 07:55) (69 - 108)  BP: 169/122 (2019 07:50) (123/91 - 169/122)  BP(mean): 136 (2019 07:50) (100 - 136)  ABP: --  ABP(mean): --  RR: 18 (2019 07:55) (16 - 24)  SpO2: 100% (2019 07:55) (97% - 100%)    Mode: AC/ CMV (Assist Control/ Continuous Mandatory Ventilation), RR (machine): 18, TV (machine): 450, FiO2: 40, PEEP: 5, MAP: 10, PIP: 25    06-12 @ 07:01  -  06-13 @ 07:00  --------------------------------------------------------  IN: 411.4 mL / OUT: 930 mL / NET: -518.6 mL      CAPILLARY BLOOD GLUCOSE          PHYSICAL EXAM:  	GENERAL: intubated, sedated  	HEAD:  Atraumatic, Normocephalic  	EYES: EOMI, PERRLA, conjunctiva and sclera clear, pupils constricted but reactive to light b/l   	NECK: Supple, No JVD  	CHEST/LUNG: surgical scar above sternum, clear to auscultation bilaterally; No wheeze  	HEART: Regular rate and rhythm; No murmurs, rubs, or gallops  	ABDOMEN: Soft, Nontender, Nondistended; Bowel sounds present  	EXTREMITIES:  2+ Peripheral Pulses, No clubbing, cyanosis, or edema  	PSYCH: AAOx0  	NEUROLOGY: non-focal  SKIN: No rashes or lesions    MEDICATIONS:  MEDICATIONS  (STANDING):  chlorhexidine 4% Liquid 1 Application(s) Topical <User Schedule>  fentaNYL   Infusion. 0.5 MICROgram(s)/kG/Hr (5.48 mL/Hr) IV Continuous <Continuous>  heparin  Injectable 5000 Unit(s) SubCutaneous every 8 hours  levETIRAcetam  IVPB 1000 milliGRAM(s) IV Intermittent every 12 hours  midazolam Infusion 0.02 mG/kG/Hr (2.192 mL/Hr) IV Continuous <Continuous>  petrolatum Ophthalmic Ointment 1 Application(s) Both EYES two times a day  propofol Infusion 18.248 MICROgram(s)/kG/Min (12 mL/Hr) IV Continuous <Continuous>    MEDICATIONS  (PRN):      ALLERGIES:  Allergies    No Known Drug Allergies  strawberry (Urticaria; Rash)  tomato (Hives; Rash)    Intolerances        LABS:                        12.9   13.00 )-----------( 198      ( 2019 06:37 )             41.5     06-13    134<L>  |  105  |  16  ----------------------------<  81  5.0   |  16<L>  |  1.43<H>    Ca    8.6      2019 05:15  Phos  3.1     06-13  Mg     2.3     06-13    TPro  6.0  /  Alb  3.6  /  TBili  0.3  /  DBili  x   /  AST  41<H>  /  ALT  19  /  AlkPhos  50  06-13    PT/INR - ( 2019 19:18 )   PT: 11.1 SEC;   INR: 0.97          PTT - ( 2019 19:18 )  PTT:27.9 SEC  Urinalysis Basic - ( 2019 06:15 )    Color: LIGHT YELLOW / Appearance: Lt TURBID / S.029 / pH: 6.0  Gluc: NEGATIVE / Ketone: NEGATIVE  / Bili: NEGATIVE / Urobili: NORMAL   Blood: MODERATE / Protein: 20 / Nitrite: NEGATIVE   Leuk Esterase: NEGATIVE / RBC: x / WBC x   Sq Epi: x / Non Sq Epi: x / Bacteria: x        RADIOLOGY & ADDITIONAL TESTS: Reviewed.

## 2019-06-13 NOTE — CHART NOTE - NSCHARTNOTEFT_GEN_A_CORE
: Abelino     INDICATION: Seizure    PROCEDURE:  [x] LIMITED ECHO  [x] LIMITED CHEST  [ ] LIMITED RETROPERITONEAL  [x] LIMITED ABDOMINAL  [ ] LIMITED DVT  [ ] NEEDLE GUIDANCE VASCULAR  [ ] NEEDLE GUIDANCE THORACENTESIS  [ ] NEEDLE GUIDANCE PARACENTESIS  [ ] NEEDLE GUIDANCE PERICARDIOCENTESIS  [ ] OTHER    FINDINGS: A-line predominant.  Concentric hypertrophic LV with grossly preserved LV function, grossly preserved RV.  No pericardial effusion.  No hydronephrosis.        INTERPRETATION: No lung consolations appreciated.  Grossly normal LV.

## 2019-06-13 NOTE — CHART NOTE - NSCHARTNOTEFT_GEN_A_CORE
Critically ill pt requring PIV access for medical management and/or pressor support. Under US guidance identified Rt UE vein, prox to AC and successfully placed 20g x 1.88 inch angiocath into vessel. . Placement confirmed s/p with ultrasound and catheter determined to be in patent lumen of vein. Pt tolerated well w/o complication.     Ana Flores PA-C

## 2019-06-14 DIAGNOSIS — R65.10 SYSTEMIC INFLAMMATORY RESPONSE SYNDROME (SIRS) OF NON-INFECTIOUS ORIGIN WITHOUT ACUTE ORGAN DYSFUNCTION: ICD-10-CM

## 2019-06-14 DIAGNOSIS — F05 DELIRIUM DUE TO KNOWN PHYSIOLOGICAL CONDITION: ICD-10-CM

## 2019-06-14 DIAGNOSIS — I82.90 ACUTE EMBOLISM AND THROMBOSIS OF UNSPECIFIED VEIN: ICD-10-CM

## 2019-06-14 DIAGNOSIS — F60.9 PERSONALITY DISORDER, UNSPECIFIED: ICD-10-CM

## 2019-06-14 DIAGNOSIS — R56.9 UNSPECIFIED CONVULSIONS: ICD-10-CM

## 2019-06-14 DIAGNOSIS — R45.1 RESTLESSNESS AND AGITATION: ICD-10-CM

## 2019-06-14 DIAGNOSIS — Z29.9 ENCOUNTER FOR PROPHYLACTIC MEASURES, UNSPECIFIED: ICD-10-CM

## 2019-06-14 DIAGNOSIS — I50.9 HEART FAILURE, UNSPECIFIED: ICD-10-CM

## 2019-06-14 DIAGNOSIS — I10 ESSENTIAL (PRIMARY) HYPERTENSION: ICD-10-CM

## 2019-06-14 LAB
LEVETIRACETAM SERPL-MCNC: 35.2 MCG/ML — SIGNIFICANT CHANGE UP (ref 12–46)
LEVETIRACETAM SERPL-MCNC: 6.9 MCG/ML — LOW (ref 12–46)

## 2019-06-14 PROCEDURE — 93306 TTE W/DOPPLER COMPLETE: CPT | Mod: 26

## 2019-06-14 PROCEDURE — 95951: CPT | Mod: 26

## 2019-06-14 PROCEDURE — 99223 1ST HOSP IP/OBS HIGH 75: CPT

## 2019-06-14 PROCEDURE — 99233 SBSQ HOSP IP/OBS HIGH 50: CPT | Mod: GC

## 2019-06-14 RX ORDER — AMLODIPINE BESYLATE 2.5 MG/1
5 TABLET ORAL DAILY
Refills: 0 | Status: DISCONTINUED | OUTPATIENT
Start: 2019-06-14 | End: 2019-06-15

## 2019-06-14 RX ORDER — LISINOPRIL 2.5 MG/1
5 TABLET ORAL DAILY
Refills: 0 | Status: DISCONTINUED | OUTPATIENT
Start: 2019-06-14 | End: 2019-06-14

## 2019-06-14 RX ORDER — CARVEDILOL PHOSPHATE 80 MG/1
6.25 CAPSULE, EXTENDED RELEASE ORAL EVERY 12 HOURS
Refills: 0 | Status: DISCONTINUED | OUTPATIENT
Start: 2019-06-14 | End: 2019-06-14

## 2019-06-14 RX ORDER — AMLODIPINE BESYLATE 2.5 MG/1
5 TABLET ORAL DAILY
Refills: 0 | Status: DISCONTINUED | OUTPATIENT
Start: 2019-06-14 | End: 2019-06-14

## 2019-06-14 RX ORDER — LANOLIN ALCOHOL/MO/W.PET/CERES
3 CREAM (GRAM) TOPICAL AT BEDTIME
Refills: 0 | Status: DISCONTINUED | OUTPATIENT
Start: 2019-06-14 | End: 2019-06-18

## 2019-06-14 RX ORDER — ACETAMINOPHEN 500 MG
650 TABLET ORAL EVERY 6 HOURS
Refills: 0 | Status: DISCONTINUED | OUTPATIENT
Start: 2019-06-14 | End: 2019-06-18

## 2019-06-14 RX ORDER — LISINOPRIL 2.5 MG/1
5 TABLET ORAL DAILY
Refills: 0 | Status: DISCONTINUED | OUTPATIENT
Start: 2019-06-14 | End: 2019-06-18

## 2019-06-14 RX ORDER — CARVEDILOL PHOSPHATE 80 MG/1
6.25 CAPSULE, EXTENDED RELEASE ORAL EVERY 12 HOURS
Refills: 0 | Status: DISCONTINUED | OUTPATIENT
Start: 2019-06-14 | End: 2019-06-16

## 2019-06-14 RX ORDER — HYDRALAZINE HCL 50 MG
10 TABLET ORAL ONCE
Refills: 0 | Status: COMPLETED | OUTPATIENT
Start: 2019-06-14 | End: 2019-06-14

## 2019-06-14 RX ORDER — LEVETIRACETAM 250 MG/1
1000 TABLET, FILM COATED ORAL
Refills: 0 | Status: DISCONTINUED | OUTPATIENT
Start: 2019-06-14 | End: 2019-06-15

## 2019-06-14 RX ORDER — BENZOCAINE AND MENTHOL 5; 1 G/100ML; G/100ML
1 LIQUID ORAL THREE TIMES A DAY
Refills: 0 | Status: DISCONTINUED | OUTPATIENT
Start: 2019-06-14 | End: 2019-06-18

## 2019-06-14 RX ADMIN — HEPARIN SODIUM 7500 UNIT(S): 5000 INJECTION INTRAVENOUS; SUBCUTANEOUS at 16:38

## 2019-06-14 RX ADMIN — Medication 100 MILLIGRAM(S): at 21:57

## 2019-06-14 RX ADMIN — LEVETIRACETAM 1000 MILLIGRAM(S): 250 TABLET, FILM COATED ORAL at 20:53

## 2019-06-14 RX ADMIN — Medication 10 MILLIGRAM(S): at 17:17

## 2019-06-14 RX ADMIN — HEPARIN SODIUM 7500 UNIT(S): 5000 INJECTION INTRAVENOUS; SUBCUTANEOUS at 06:02

## 2019-06-14 RX ADMIN — CARVEDILOL PHOSPHATE 6.25 MILLIGRAM(S): 80 CAPSULE, EXTENDED RELEASE ORAL at 17:17

## 2019-06-14 RX ADMIN — Medication 3 MILLIGRAM(S): at 22:01

## 2019-06-14 RX ADMIN — Medication 1 MILLIGRAM(S): at 18:44

## 2019-06-14 RX ADMIN — AMLODIPINE BESYLATE 5 MILLIGRAM(S): 2.5 TABLET ORAL at 18:38

## 2019-06-14 RX ADMIN — Medication 650 MILLIGRAM(S): at 21:56

## 2019-06-14 RX ADMIN — BENZOCAINE AND MENTHOL 1 LOZENGE: 5; 1 LIQUID ORAL at 21:57

## 2019-06-14 RX ADMIN — LISINOPRIL 5 MILLIGRAM(S): 2.5 TABLET ORAL at 18:38

## 2019-06-14 NOTE — PHYSICAL THERAPY INITIAL EVALUATION ADULT - ADDITIONAL COMMENTS
Pt. lives in a private  apartment with her children Pt. reports that they have steps with handrails x1 to negotiate at home. Pt. previously ambulating independently without an assistive device and independent with ADLs. Pt. returned to bed all lines/tubes intact, call bell in reach and in NAD. medical team bedside at end of session
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
## 2019-06-14 NOTE — PROGRESS NOTE ADULT - PROBLEM SELECTOR PLAN 6
DVT ppx: heparin subq  PT eval DVT ppx: heparin subq  PT: home w/ no PT hx of LV thrombus s/p thrombectomy in 2015; prescribed coumadin but according to the daughter, patient has been taken off the coumadin by a provider less than a year ago

## 2019-06-14 NOTE — BEHAVIORAL HEALTH ASSESSMENT NOTE - NSBHCHARTREVIEWINVESTIGATE_PSY_A_CORE FT
< from: 12 Lead ECG (06.12.19 @ 19:03) >    Ventricular Rate 103 BPM    Atrial Rate 103 BPM    P-R Interval 160 ms    QRS Duration 94 ms    Q-T Interval 330 ms    QTC Calculation(Bezet) 432 ms    P Axis 56 degrees    R Axis 8 degrees    T Axis 105 degrees    Diagnosis Line Sinus tachycardia  Biatrial enlargement  Septal infarct , age undetermined  T wave abnormality, consider lateral ischemia  Abnormal ECG
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
## 2019-06-14 NOTE — PROVIDER CONTACT NOTE (OTHER) - ASSESSMENT
Patient refused vital signs, assessment, medications; patient had outburst of agitation throughout the day. Patient remained NPO status upon arrival to unit due to pending speech/swallow. Patient educated on safety precautions, to ambulate with assistance, seizure precautions, and NPO status. Patient unable to verbalize education; patient ambulated without assistance. Patient educated regarding NPO status; patient drank water regardless. Patient refused 0800 keppra, vital signs, and assessment. Patient has EEG attached for monitoring; patient educated to ambulate with assistance to help prevent any falls due to cords with EEG. Patient stated that at 0415, she was given 2 cups of apple juice. Patient became agitated when education provided on NPO status on 9N. Patient attempted to be educated; refused to listen. Patient refused vital signs, assessment, medications; patient had outburst of agitation throughout the day. Patient remained NPO status upon arrival to unit due to pending speech/swallow. Patient educated on safety precautions, to ambulate with assistance, seizure precautions, and NPO status. Patient unable to verbalize education; patient ambulated without assistance. Patient educated regarding NPO status; patient drank water regardless. Patient refused 0800 keppra, vital signs, and assessment. Patient has EEG attached for monitoring; patient educated to ambulate with assistance to help prevent any falls due to cords with EEG. Patient stated that at 0415, she was given 2 cups of apple juice. Patient became agitated when education provided on NPO status on 9N. Patient attempted to be educated; refused to listen. When patient had outburst of agitation in the hallway; RN was holding eeg cables. ANM witnessed this. Patient was able to be escorted back to her room and able to sit down.

## 2019-06-14 NOTE — BEHAVIORAL HEALTH ASSESSMENT NOTE - NSBHCHARTREVIEWLAB_PSY_A_CORE FT
CBC Full  -  ( 2019 06:37 )  WBC Count : 13.00 K/uL  RBC Count : 5.24 M/uL  Hemoglobin : 12.9 g/dL  Hematocrit : 41.5 %  Platelet Count - Automated : 198 K/uL  Mean Cell Volume : 79.2 fL  Mean Cell Hemoglobin : 24.6 pg  Mean Cell Hemoglobin Concentration : 31.1 %  Auto Neutrophil # : 9.60 K/uL  Auto Lymphocyte # : 2.26 K/uL  Auto Monocyte # : 0.97 K/uL  Auto Eosinophil # : 0.10 K/uL  Auto Basophil # : 0.02 K/uL  Auto Neutrophil % : 73.7 %  Auto Lymphocyte % : 17.4 %  Auto Monocyte % : 7.5 %  Auto Eosinophil % : 0.8 %  Auto Basophil % : 0.2 %        140  |  108<H>  |  16  ----------------------------<  119<H>  4.8   |  19<L>  |  1.09    Ca    9.3      2019 08:53  Phos  3.3       Mg     2.2         TPro  6.0  /  Alb  3.6  /  TBili  0.3  /  DBili  x   /  AST  41<H>  /  ALT  19  /  AlkPhos  50  13    LIVER FUNCTIONS - ( 2019 05:15 )  Alb: 3.6 g/dL / Pro: 6.0 g/dL / ALK PHOS: 50 u/L / ALT: 19 u/L / AST: 41 u/L / GGT: x           Urinalysis Basic - ( 2019 06:15 )    Color: LIGHT YELLOW / Appearance: Lt TURBID / S.029 / pH: 6.0  Gluc: NEGATIVE / Ketone: NEGATIVE  / Bili: NEGATIVE / Urobili: NORMAL   Blood: MODERATE / Protein: 20 / Nitrite: NEGATIVE   Leuk Esterase: NEGATIVE / RBC: x / WBC x   Sq Epi: x / Non Sq Epi: x / Bacteria: x

## 2019-06-14 NOTE — PROVIDER CONTACT NOTE (OTHER) - ACTION/TREATMENT ORDERED:
Psych consult evaluation provided. MD made aware and assessed patient. Patient continued to refuse treatments.

## 2019-06-14 NOTE — BEHAVIORAL HEALTH ASSESSMENT NOTE - NSBHCHARTREVIEWIMAGING_PSY_A_CORE FT
< from: CT Head No Cont (06.12.19 @ 18:52) >      EXAM:  CT CERVICAL SPINE      EXAM:  CT BRAIN      PROCEDURE DATE:  Jun 12 2019     INTERPRETATION:  HISTORY: Trauma.    Technique: Noncontrast CT of the head and cervical spine was performed.    Multiple contiguous axial images were acquiredfrom the skullbase to the   vertex without the administration of intravenous contrast.    Helically acquired images from the skullbase to the T2 level were   reformatted in coronal and sagittal plane and viewed in bone and soft   tissue window.    COMPARISON: MRI head 3/20/2017.    FINDINGS:  Head CT:  There is no acute intracranial mass-effect, hemorrhage, midline shift, or   abnormal extra-axial fluid collection.    Chronic left basal ganglia/corona radiata infarct with ex vacuo   dilatation ofthe left lateral ventricle. Additional chronic left   parietal infarct.    Ventricles, sulci, and cisterns are normal in size for the patient's age.   No hydrocephalus. Basal cisterns are patent.     Paranasal sinuses and mastoid air cells are clear. Calvarium is intact.     IMPRESSION:  CT head:  No acute intracranial hemorrhage, mass effect or midline shift. No acute   displaced calvarial fracture.
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
## 2019-06-14 NOTE — PHYSICAL THERAPY INITIAL EVALUATION ADULT - PATIENT PROFILE REVIEW, REHAB EVAL
yes/Pt. profile reviewed, consulted with TARIQ Lovell prior to initial PT evaluation and tx, as per RN, Pt. is OK to participate in skilled therapy session, current activity orders; increase as tolerated

## 2019-06-14 NOTE — CONSULT NOTE ADULT - ATTENDING COMMENTS
Patient is seen and examed.  Patient was intubated and now extubated.  Currently AA Xo3.  Speech fluent.  CN II-XII intact.  Motor:  5/5 x4.  sensory intact.  Gait is normal  Reflexes 1+, plantar:  flexor.    A/P:  1. Epilepsy, s/p breakthrough seizure, possible incompliance with meds.  Continue Keppra 1000mg bid for now.  EEG noted, no acute seizures.  Recommend adding lamictal starting at 25 mg po qd and gradually increasing by 25 mg q weekly to a goal dose of 100mg bid.  (may consider decreasing keppra as outpatient when lamictal is therapeutic since patient has some agitation/personality disorder. )   2.  Psych f/u.   3. Seizure precautions .  Discussed with patient about not driving for one year.  Patient has to be seizure free for one year to drive again.
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
## 2019-06-14 NOTE — PROGRESS NOTE ADULT - PROBLEM SELECTOR PLAN 5
hx of LV thrombus s/p thrombectomy in 2015; prescribed coumadin but according to the daughter, patient has been taken off the coumadin by a provider less than a year ago will restart carvedilol 6.25 BID, lisinopril 5mg daily and amlodipine 5mg daily
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
## 2019-06-14 NOTE — PHYSICAL THERAPY INITIAL EVALUATION ADULT - DISCHARGE DISPOSITION, PT EVAL
anticipated discharge to home with no skilled restorative physical therapy services upon discharge from [facility]. Please follow therapy for continued assessment.

## 2019-06-14 NOTE — BEHAVIORAL HEALTH ASSESSMENT NOTE - NS ED [facility]
Problem: Patient Care Overview (Adult)  Goal: Plan of Care Review  Outcome: Outcome(s) achieved Date Met:  03/22/17 03/22/17 3920   Outcome Evaluation   Outcome Summary/Follow up Plan OT IE completed per consult. Pt demonstrates baseline function to complete ADL tasks, bed mobility, transfers and fxnl mobility. No deficits found with strength or coordination. No skilled intervention needed.             None known

## 2019-06-14 NOTE — BEHAVIORAL HEALTH ASSESSMENT NOTE - NSBHCHARTREVIEWVS_PSY_A_CORE FT
Vital Signs Last 24 Hrs  T(C): 36.7 (14 Jun 2019 13:05), Max: 36.7 (14 Jun 2019 13:05)  T(F): 98 (14 Jun 2019 13:05), Max: 98 (14 Jun 2019 13:05)  HR: 83 (14 Jun 2019 13:05) (55 - 109)  BP: 143/103 (14 Jun 2019 05:00) (106/66 - 143/103)  BP(mean): 116 (14 Jun 2019 05:00) (79 - 116)  RR: 20 (14 Jun 2019 13:05) (11 - 29)  SpO2: 98% (14 Jun 2019 13:05) (98% - 100%) Discharged

## 2019-06-14 NOTE — PROGRESS NOTE ADULT - SUBJECTIVE AND OBJECTIVE BOX
Patient is a 44y old  Female who presents with a chief complaint of seizure, AMS (2019 07:32)      SUBJECTIVE / OVERNIGHT EVENTS:  Pt was agitated in AM as per nursing staff. Pt was walking in hallway without clothing. Pt denies any acute issues overnight.     MEDICATIONS  (STANDING):  heparin  Injectable 7500 Unit(s) SubCutaneous every 8 hours  levETIRAcetam  Solution 1000 milliGRAM(s) Oral two times a day    MEDICATIONS  (PRN):      Vital Signs Last 24 Hrs  T(C): 36.7 (2019 13:05), Max: 36.7 (2019 16:00)  T(F): 98 (2019 13:05), Max: 98 (2019 16:00)  HR: 83 (2019 13:05) (55 - 109)  BP: 143/103 (2019 05:00) (104/66 - 143/103)  BP(mean): 116 (2019 05:00) (77 - 116)  RR: 20 (2019 13:05) (11 - 29)  SpO2: 98% (2019 13:05) (98% - 100%)    CAPILLARY BLOOD GLUCOSE        I&O's Summary    2019 07:01  -  2019 07:00  --------------------------------------------------------  IN: 921 mL / OUT: 1535 mL / NET: -614 mL        PHYSICAL EXAM:      GENERAL: NAD, mildly excitable   HEAD:  Normocephalic, atraumatic  HEENT: Moist mucous membranes  NECK: Supple, No JVD  NERVOUS SYSTEM:  Alert & Oriented X3, Motor Strength 5/5 B/L upper and lower extremities  CHEST/LUNG: Clear to auscultation bilaterally  HEART: Regular rate and rhythm, no murmur   ABDOMEN: Soft, Nontender, Nondistended, Bowel sounds present  EXTREMITIES:   No clubbing, cyanosis, or edema  MUSCULOSKELTAL- No muscle tenderness, no joint tenderness  SKIN- warm and dry, no rash             LABS:                        12.9   13.00 )-----------( 198      ( 2019 06:37 )             41.5     Auto Eosinophil # 0.10  / Auto Eosinophil % 0.8   / Auto Neutrophil # 9.60  / Auto Neutrophil % 73.7  / BANDS % x                            15.2   9.09  )-----------( 271      ( 2019 18:00 )             53.6     Auto Eosinophil # 0.18  / Auto Eosinophil % 2.0   / Auto Neutrophil # 2.05  / Auto Neutrophil % 22.6  / BANDS % 0        06-13    140  |  108<H>  |  16  ----------------------------<  119<H>  4.8   |  19<L>  |  1.09  06-13    134<L>  |  105  |  16  ----------------------------<  81  5.0   |  16<L>  |  1.43<H>  06-12    138  |  99  |  13  ----------------------------<  206<H>  4.8   |  < 5<LL>  |  1.08    Ca    9.3      2019 08:53  Mg     2.2     06-13  Phos  3.3     06-13  TPro  6.0  /  Alb  3.6  /  TBili  0.3  /  DBili  x   /  AST  41<H>  /  ALT  19  /  AlkPhos  50  06-13  TPro  8.1  /  Alb  4.7  /  TBili  < 0.2<L>  /  DBili  x   /  AST  30  /  ALT  18  /  AlkPhos  87  06-12    PT/INR - ( 2019 19:18 )   PT: 11.1 SEC;   INR: 0.97          PTT - ( 2019 19:18 )  PTT:27.9 SEC  CARDIAC MARKERS ( 2019 18:00 )  x     / x     / 114 u/L / x     / x          Urinalysis Basic - ( 2019 06:15 )    Color: LIGHT YELLOW / Appearance: Lt TURBID / S.029 / pH: 6.0  Gluc: NEGATIVE / Ketone: NEGATIVE  / Bili: NEGATIVE / Urobili: NORMAL   Blood: MODERATE / Protein: 20 / Nitrite: NEGATIVE   Leuk Esterase: NEGATIVE / RBC: x / WBC x   Sq Epi: x / Non Sq Epi: x / Bacteria: x          RESPIRATORY  VENT:  Mode: standby  ABG: ( 2019 00:56 ) pH: 7.34  /  pCO2: 34    /  pO2: 207   / HCO3: 19    / Base Excess: -6.9  /  SaO2: 99.6            ( 2019 20:55 ) pH: 7.23  /  pCO2: 44    /  pO2: 132   / HCO3: 17    / Base Excess: -8.4  /  SaO2: 97.7            ( 2019 19:08 ) pH: 7.05  /  pCO2: 61    /  pO2: 126   / HCO3: 13    / Base Excess: -12.8 /  SaO2: 95.7                VBG:     RADIOLOGY & ADDITIONAL TESTS:  (Imaging Personally Reviewed): CT head, chest, cervical spine, CXR    Consultant(s) Notes Reviewed:  PT    Care Discussed with Consultants/Other Providers: OWEN Patient is a 44y old  Female who presents with a chief complaint of seizure, AMS (2019 07:32)    SUBJECTIVE / OVERNIGHT EVENTS:  Pt was agitated in AM as per nursing staff. Pt was walking in hallway without clothing. Pt denies any acute issues overnight.     MEDICATIONS  (STANDING):  heparin  Injectable 7500 Unit(s) SubCutaneous every 8 hours  levETIRAcetam  Solution 1000 milliGRAM(s) Oral two times a day    Vital Signs Last 24 Hrs  T(C): 36.7 (2019 13:05), Max: 36.7 (2019 16:00)  T(F): 98 (2019 13:05), Max: 98 (2019 16:00)  HR: 83 (2019 13:05) (55 - 109)  BP: 143/103 (2019 05:00) (104/66 - 143/103)  BP(mean): 116 (2019 05:00) (77 - 116)  RR: 20 (2019 13:05) (11 - 29)  SpO2: 98% (2019 13:05) (98% - 100%)    I&O's Summary    2019 07:01  -  2019 07:00  --------------------------------------------------------  IN: 921 mL / OUT: 1535 mL / NET: -614 mL    PHYSICAL EXAM:    GENERAL: NAD, mildly excitable   HEAD:  Normocephalic, atraumatic  HEENT: Moist mucous membranes  NECK: Supple, No JVD  NERVOUS SYSTEM:  Alert & Oriented X3, Motor Strength 5/5 B/L upper and lower extremities  CHEST/LUNG: Clear to auscultation bilaterally  HEART: Regular rate and rhythm, no murmur   ABDOMEN: Soft, Nontender, Nondistended, Bowel sounds present  EXTREMITIES:   No clubbing, cyanosis, or edema  MUSCULOSKELTAL- No muscle tenderness, no joint tenderness  SKIN- warm and dry, no rash       LABS:                        12.9   13.00 )-----------( 198      ( 2019 06:37 )             41.5     Auto Eosinophil # 0.10  / Auto Eosinophil % 0.8   / Auto Neutrophil # 9.60  / Auto Neutrophil % 73.7  / BANDS % x                            15.2   9.09  )-----------( 271      ( 2019 18:00 )             53.6     Auto Eosinophil # 0.18  / Auto Eosinophil % 2.0   / Auto Neutrophil # 2.05  / Auto Neutrophil % 22.6  / BANDS % 0        06-13    140  |  108<H>  |  16  ----------------------------<  119<H>  4.8   |  19<L>  |  1.09  06-13    134<L>  |  105  |  16  ----------------------------<  81  5.0   |  16<L>  |  1.43<H>  0612    138  |  99  |  13  ----------------------------<  206<H>  4.8   |  < 5<LL>  |  1.08    Ca    9.3      2019 08:53  Mg     2.2     06-13  Phos  3.3     06-13  TPro  6.0  /  Alb  3.6  /  TBili  0.3  /  DBili  x   /  AST  41<H>  /  ALT  19  /  AlkPhos  50  06-13  TPro  8.1  /  Alb  4.7  /  TBili  < 0.2<L>  /  DBili  x   /  AST  30  /  ALT  18  /  AlkPhos  87  06-12    PT/INR - ( 2019 19:18 )   PT: 11.1 SEC;   INR: 0.97          PTT - ( 2019 19:18 )  PTT:27.9 SEC  CARDIAC MARKERS ( 2019 18:00 )  x     / x     / 114 u/L / x     / x          Urinalysis Basic - ( 2019 06:15 )    Color: LIGHT YELLOW / Appearance: Lt TURBID / S.029 / pH: 6.0  Gluc: NEGATIVE / Ketone: NEGATIVE  / Bili: NEGATIVE / Urobili: NORMAL   Blood: MODERATE / Protein: 20 / Nitrite: NEGATIVE   Leuk Esterase: NEGATIVE / RBC: x / WBC x   Sq Epi: x / Non Sq Epi: x / Bacteria: x    RESPIRATORY  VENT:  Mode: standby  ABG: ( 2019 00:56 ) pH: 7.34  /  pCO2: 34    /  pO2: 207   / HCO3: 19    / Base Excess: -6.9  /  SaO2: 99.6      ( 2019 20:55 ) pH: 7.23  /  pCO2: 44    /  pO2: 132   / HCO3: 17    / Base Excess: -8.4  /  SaO2: 97.7      ( 2019 19:08 ) pH: 7.05  /  pCO2: 61    /  pO2: 126   / HCO3: 13    / Base Excess: -12.8 /  SaO2: 95.7        RADIOLOGY & ADDITIONAL TESTS:  (Imaging Personally Reviewed): CT head, chest, cervical spine, CXR    Consultant(s) Notes Reviewed:  PT    Care Discussed with Consultants/Other Providers: NA

## 2019-06-14 NOTE — PHYSICAL THERAPY INITIAL EVALUATION ADULT - PLANNED THERAPY INTERVENTIONS, PT EVAL
balance training/bed mobility training/gait training/transfer training/stair negotiation/strengthening
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
## 2019-06-14 NOTE — PROGRESS NOTE ADULT - PROBLEM SELECTOR PLAN 3
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

## 2019-06-14 NOTE — EEG REPORT - NS EEG TEXT BOX
[facility]   COMPREHENSIVE EPILEPSY CENTER   REPORT OF ROUTINE VIDEO EEG     [facility]: [address], [place] [address], Ph#: [phone]  LI: [address], [place] [address], Ph#: [phone]  Office: [address] [place] [address], [facility] [address], [place] [address] Ph#: [phone]    Patient Name: JARED LAMAS  Age and : 44y ([birth year])  MRN #: [id number]  Location: [facility] 9[address]  Referring Physician: Gail Carmichael    Study Date: 19    _____________________________________________________________  TECHNICAL INFORMATION    Placement and Labeling of Electrodes:  The EEG was performed utilizing 20 channels referential EEG connections (coronal over temporal over parasagittal montage) using all standard 10-20 electrode placements with EKG.  Recording was at a sampling rate of 256 samples per second per channel.  Time synchronized digital video recording was done simultaneously with EEG recording.  A low light infrared camera was used for low light recording.  Orion and seizure detection algorithms were utilized.    _____________________________________________________________  HISTORY    Patient is a 44y old  Female who presents with a chief complaint of seizure, AMS (2019 07:32)    PERTINENT MEDICATION:  heparin  Injectable 7500 Unit(s) SubCutaneous every 8 hours  levETIRAcetam  Solution 1000 milliGRAM(s) Oral two times a day  _____________________________________________________________  STUDY INTERPRETATION    Findings: The background was continuous, spontaneously variable and reactive.Background predominantly consisted of theta, delta and faster activities. No posterior dominant rhythm seen.    Focal Slowing:   None was present.    Sleep Background:  Drowsiness was characterized by fragmentation, attenuation, and slowing of the background activity.    Stage II sleep transients were not recorded.    Other Non-Epileptiform Findings:  None were present.    Interictal Epileptiform Activity:   None were present.    Events:  Clinical events: None recorded.  Seizures: None recorded.    Activation Procedures:   Hyperventilation was not performed.    Photic stimulation was performed and did not elicit any abnormality.     Artifacts:  Intermittent myogenic and movement artifacts were noted.    ECG:  The heart rate on single channel ECG was predominantly between 60-70 BPM.    _____________________________________________________________  EEG SUMMARY/CLASSIFICATION    Abnormal EEG in a AMS patient.  - Moderate generalized slowing.  _____________________________________________________________  EEG IMPRESSION/CLINICAL CORRELATE    Abnormal EEG study.  1. Moderate nonspecific diffuse or multifocal cerebral dysfunction.   2. No epileptiform pattern or seizure seen.        Preliminary Fellow Read:      Caitlin Dhlilon MD  Adult EEG Fellow, [facility] Epilepsy [facility]   COMPREHENSIVE EPILEPSY CENTER   REPORT OF ROUTINE VIDEO EEG     [facility]: [address], [place] [address], Ph#: [phone]  LI: [address], [place] [address], Ph#: [phone]  Office: [address] [place] [address], [facility] [address], [place] [address] Ph#: [phone]    Patient Name: JARED LAMAS  Age and : 44y (77)  MRN #: [id number]  Location: [facility] [place] [facility] 9[address]  Referring Physician: Gail Carmichael    Study Date: 19    _____________________________________________________________  TECHNICAL INFORMATION    Placement and Labeling of Electrodes:  The EEG was performed utilizing 20 channels referential EEG connections (coronal over temporal over parasagittal montage) using all standard 10-20 electrode placements with EKG.  Recording was at a sampling rate of 256 samples per second per channel.  Time synchronized digital video recording was done simultaneously with EEG recording.  A low light infrared camera was used for low light recording.  Orion and seizure detection algorithms were utilized.    _____________________________________________________________  HISTORY    Patient is a 44y old  Female who presents with a chief complaint of seizure, AMS (2019 07:32)    PERTINENT MEDICATION:  heparin  Injectable 7500 Unit(s) SubCutaneous every 8 hours  levETIRAcetam  Solution 1000 milliGRAM(s) Oral two times a day  _____________________________________________________________  STUDY INTERPRETATION    Findings: The background was continuous, spontaneously variable and reactive.Background predominantly consisted of theta, delta and faster activities. No posterior dominant rhythm seen.    Focal Slowing:   None was present.    Sleep Background:  Drowsiness was characterized by fragmentation, attenuation, and slowing of the background activity.    Stage II sleep transients were not recorded.    Other Non-Epileptiform Findings:  None were present.    Interictal Epileptiform Activity:   None were present.    Events:  Clinical events: None recorded.  Seizures: None recorded.    Activation Procedures:   Hyperventilation was not performed.    Photic stimulation was performed and did not elicit any abnormality.     Artifacts:  Intermittent myogenic and movement artifacts were noted.    ECG:  The heart rate on single channel ECG was predominantly between 60-70 BPM.    _____________________________________________________________  EEG SUMMARY/CLASSIFICATION    Abnormal EEG in a AMS patient.  - Moderate generalized slowing.  _____________________________________________________________  EEG IMPRESSION/CLINICAL CORRELATE    Abnormal EEG study.  1. Moderate nonspecific diffuse or multifocal cerebral dysfunction.   2. No epileptiform pattern or seizure seen.    Caitlin Dhillon MD  Adult EEG Fellow, [facility] Epilepsy Center    Narciso Acevedo MD  EEG / Epilepsy Attending Physician

## 2019-06-14 NOTE — PROVIDER CONTACT NOTE (OTHER) - ACTION/TREATMENT ORDERED:
MD made aware and states will put in for IVP Hydralazine and will contact pharmacy for at home BP medications. Will continue to monitor

## 2019-06-14 NOTE — PHYSICAL THERAPY INITIAL EVALUATION ADULT - GAIT DISTANCE, PT EVAL
25 feet/further distance limited secondary to line of EEG, will assess further ambulation distance and stair negotiation when feasible. please continue to follow PT services

## 2019-06-14 NOTE — PROVIDER CONTACT NOTE (OTHER) - ACTION/TREATMENT ORDERED:
MD made aware and states okay to give amlodipine and lisinopril at this time. Will continue to monitor

## 2019-06-14 NOTE — PROGRESS NOTE ADULT - PROBLEM SELECTOR PLAN 4
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
## 2019-06-14 NOTE — PROGRESS NOTE ADULT - PROBLEM SELECTOR PLAN 2
will restart carvedilol 6.25 BID and amlodipine 5mg daily Pt with leukocytosis and tachypnea meeting SIRS criteria  Pr afebrile, with clear lungs, negative RVP, negative UA, unlikely to be of infectious etiology; leukocytosis more likely to be stress response, will monitor off Abx for now and treat empirically if spikes Pt with leukocytosis and tachypnea meeting SIRS criteria  Pt afebrile, with clear lungs, negative RVP, negative UA, unlikely to be of infectious etiology; leukocytosis more likely to be stress response, will monitor off Abx for now and treat empirically if spikes

## 2019-06-14 NOTE — EEG REPORT - NS EEG TEXT BOX
[facility]   COMPREHENSIVE EPILEPSY CENTER   REPORT OF CONTINUOUS VIDEO EEG     [facility]: [address], [place] [address], Ph#: [phone]  [facility]: [address], [place] [address], Ph#: [phone]  Office: [address] [place] [address], [address] [address], [place] [address] Ph#: [phone]    Patient Name: JARED LAMAS  Age and : 44y (77)  MRN #: [id number]  Location: [facility] 901   Referring Physician: Gail Carmichael    Study Date: 19 - 19    _____________________________________________________________  STUDY INFORMATION    EEG Recording Technique:  The patient underwent continuous Video-EEG monitoring, using Telemetry System hardware on the XLTek Digital System. EEG and video data were stored on a computer hard drive with important events saved in digital archive files. The material was reviewed by a physician (electroencephalographer / epileptologist) on a daily basis. Orion and seizure detection algorithms were utilized and reviewed. An EEG Technician attended to the patient, and was available throughout daytime work hours.  The epilepsy center neurologist was available in person or on call 24-hours per day.    EEG Placement and Labeling of Electrodes:  The EEG was performed utilizing 20 channel referential EEG connections (coronal over temporal over parasagittal montage) using all standard 10-20 electrode placements with EKG, with additional electrodes placed in the inferior temporal region using the modified 10-10 montage electrode placements for elective admissions, or if deemed necessary. Recording was at a sampling rate of 256 samples per second per channel. Time synchronized digital video recording was done simultaneously with EEG recording. A low light infrared camera was used for low light recording.     _____________________________________________________________    HISTORY    Patient is a 44y old  Female who presents with a chief complaint of seizure, AMS (2019 07:32)    PERTINENT MEDICATION:  heparin  Injectable 7500 Unit(s) SubCutaneous every 8 hours  levETIRAcetam  Solution 1000 milliGRAM(s) Oral two times a day  _____________________________________________________________  STUDY INTERPRETATION    Findings: The background was continuous, spontaneously variable and reactive.Background predominantly consisted of theta, delta and faster activities. After extubation and during wakefulness a 9Hz posterior dominant rhythm was present.     Focal Slowing:   None was present.    Sleep Background:  Drowsiness was characterized by fragmentation, attenuation, and slowing of the background activity.    Stage II sleep transients characterized by rudimentry symmetric spindles and K complexes.     Other Non-Epileptiform Findings:  None were present.    Interictal Epileptiform Activity:   None were present.    Events:  Clinical events: None recorded.  Seizures: None recorded.    Activation Procedures:   Hyperventilation was not performed.    Photic stimulation was not  performed.    Artifacts:  Intermittent myogenic and movement artifacts were noted.    ECG:  The heart rate on single channel ECG was predominantly between 60-70 BPM.    _____________________________________________________________  EEG SUMMARY/CLASSIFICATION    Abnormal EEG in a AMS patient.  - Mild to moderate generalized slowing.  _____________________________________________________________  EEG IMPRESSION/CLINICAL CORRELATE    Abnormal EEG study.  1. Mild to moderate nonspecific diffuse or multifocal cerebral dysfunction.   2. No epileptiform pattern or seizure seen.        Preliminary Fellow Read:      Caitlin Dhillon MD  Adult EEG Fellow, [facility] Epilepsy [facility]   COMPREHENSIVE EPILEPSY CENTER   REPORT OF CONTINUOUS VIDEO EEG     [facility]: [address], [place] [address], Ph#: [phone]  [facility]: [address], [place] [address], Ph#: [phone]  Office: [address] [place] [address], [address] [address], [place] [address] Ph#: [phone]    Patient Name: JARED LAMAS  Age and : 44y (77)  MRN #: [id number]  Location: [facility] 901   Referring Physician: Gail Carmichael    Study Date: 19 - 19    _____________________________________________________________  STUDY INFORMATION    EEG Recording Technique:  The patient underwent continuous Video-EEG monitoring, using Telemetry System hardware on the XLTek Digital System. EEG and video data were stored on a computer hard drive with important events saved in digital archive files. The material was reviewed by a physician (electroencephalographer / epileptologist) on a daily basis. Orion and seizure detection algorithms were utilized and reviewed. An EEG Technician attended to the patient, and was available throughout daytime work hours.  The epilepsy center neurologist was available in person or on call 24-hours per day.    EEG Placement and Labeling of Electrodes:  The EEG was performed utilizing 20 channel referential EEG connections (coronal over temporal over parasagittal montage) using all standard 10-20 electrode placements with EKG, with additional electrodes placed in the inferior temporal region using the modified 10-10 montage electrode placements for elective admissions, or if deemed necessary. Recording was at a sampling rate of 256 samples per second per channel. Time synchronized digital video recording was done simultaneously with EEG recording. A low light infrared camera was used for low light recording.     _____________________________________________________________    HISTORY    Patient is a 44y old  Female who presents with a chief complaint of seizure, AMS (2019 07:32)    PERTINENT MEDICATION:  heparin  Injectable 7500 Unit(s) SubCutaneous every 8 hours  levETIRAcetam  Solution 1000 milliGRAM(s) Oral two times a day  _____________________________________________________________  STUDY INTERPRETATION    Findings: The background was continuous, spontaneously variable and reactive.Background predominantly consisted of theta, delta and faster activities. After extubation and during wakefulness a 9Hz posterior dominant rhythm was present.     Focal Slowing:   None was present.    Sleep Background:  Drowsiness was characterized by fragmentation, attenuation, and slowing of the background activity.    Stage II sleep transients characterized by rudimentry symmetric spindles and K complexes.     Other Non-Epileptiform Findings:  None were present.    Interictal Epileptiform Activity:   None were present.    Events:  Clinical events: None recorded.  Seizures: None recorded.    Activation Procedures:   Hyperventilation was not performed.    Photic stimulation was not  performed.    Artifacts:  Intermittent myogenic and movement artifacts were noted.    ECG:  The heart rate on single channel ECG was predominantly between 60-70 BPM.    _____________________________________________________________  EEG SUMMARY/CLASSIFICATION    Abnormal EEG in a AMS patient.  - Mild to moderate generalized slowing.  _____________________________________________________________  EEG IMPRESSION/CLINICAL CORRELATE    Abnormal EEG study.  1. Mild to moderate nonspecific diffuse or multifocal cerebral dysfunction.   2. No epileptiform pattern or seizure seen.      Caitlin Dhillon MD  Adult EEG Fellow, [facility]ilepsy [facility]    Narciso Acevedo MD  EEG / Epilepsy Attending Physician

## 2019-06-14 NOTE — BEHAVIORAL HEALTH ASSESSMENT NOTE - NSBHCONSULTRECOMMENDOTHER_PSY_A_CORE FT
- Pt encouraged to accept Keppra doses. 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

## 2019-06-15 LAB
ALBUMIN SERPL ELPH-MCNC: 4 G/DL — SIGNIFICANT CHANGE UP (ref 3.3–5)
ALP SERPL-CCNC: 58 U/L — SIGNIFICANT CHANGE UP (ref 40–120)
ALT FLD-CCNC: 12 U/L — SIGNIFICANT CHANGE UP (ref 4–33)
ANION GAP SERPL CALC-SCNC: 13 MMO/L — SIGNIFICANT CHANGE UP (ref 7–14)
APPEARANCE UR: CLEAR — SIGNIFICANT CHANGE UP
AST SERPL-CCNC: 21 U/L — SIGNIFICANT CHANGE UP (ref 4–32)
BACTERIA # UR AUTO: NEGATIVE — SIGNIFICANT CHANGE UP
BILIRUB SERPL-MCNC: 0.6 MG/DL — SIGNIFICANT CHANGE UP (ref 0.2–1.2)
BILIRUB UR-MCNC: NEGATIVE — SIGNIFICANT CHANGE UP
BLOOD UR QL VISUAL: HIGH
BUN SERPL-MCNC: 10 MG/DL — SIGNIFICANT CHANGE UP (ref 7–23)
CALCIUM SERPL-MCNC: 10.1 MG/DL — SIGNIFICANT CHANGE UP (ref 8.4–10.5)
CHLORIDE SERPL-SCNC: 105 MMOL/L — SIGNIFICANT CHANGE UP (ref 98–107)
CO2 SERPL-SCNC: 21 MMOL/L — LOW (ref 22–31)
COLOR SPEC: YELLOW — SIGNIFICANT CHANGE UP
CREAT SERPL-MCNC: 0.82 MG/DL — SIGNIFICANT CHANGE UP (ref 0.5–1.3)
GLUCOSE SERPL-MCNC: 77 MG/DL — SIGNIFICANT CHANGE UP (ref 70–99)
GLUCOSE UR-MCNC: NEGATIVE — SIGNIFICANT CHANGE UP
HCT VFR BLD CALC: 40.1 % — SIGNIFICANT CHANGE UP (ref 34.5–45)
HGB BLD-MCNC: 12.4 G/DL — SIGNIFICANT CHANGE UP (ref 11.5–15.5)
HYALINE CASTS # UR AUTO: NEGATIVE — SIGNIFICANT CHANGE UP
KETONES UR-MCNC: NEGATIVE — SIGNIFICANT CHANGE UP
LEUKOCYTE ESTERASE UR-ACNC: NEGATIVE — SIGNIFICANT CHANGE UP
MAGNESIUM SERPL-MCNC: 1.8 MG/DL — SIGNIFICANT CHANGE UP (ref 1.6–2.6)
MCHC RBC-ENTMCNC: 25.1 PG — LOW (ref 27–34)
MCHC RBC-ENTMCNC: 30.9 % — LOW (ref 32–36)
MCV RBC AUTO: 81.2 FL — SIGNIFICANT CHANGE UP (ref 80–100)
NITRITE UR-MCNC: NEGATIVE — SIGNIFICANT CHANGE UP
NRBC # FLD: 0 K/UL — SIGNIFICANT CHANGE UP (ref 0–0)
NT-PROBNP SERPL-SCNC: 1889 PG/ML — SIGNIFICANT CHANGE UP
PH UR: 6.5 — SIGNIFICANT CHANGE UP (ref 5–8)
PHOSPHATE SERPL-MCNC: 2.4 MG/DL — LOW (ref 2.5–4.5)
PLATELET # BLD AUTO: 192 K/UL — SIGNIFICANT CHANGE UP (ref 150–400)
PMV BLD: SIGNIFICANT CHANGE UP FL (ref 7–13)
POTASSIUM SERPL-MCNC: 3.5 MMOL/L — SIGNIFICANT CHANGE UP (ref 3.5–5.3)
POTASSIUM SERPL-SCNC: 3.5 MMOL/L — SIGNIFICANT CHANGE UP (ref 3.5–5.3)
PROT SERPL-MCNC: 7.4 G/DL — SIGNIFICANT CHANGE UP (ref 6–8.3)
PROT UR-MCNC: 30 — SIGNIFICANT CHANGE UP
RBC # BLD: 4.94 M/UL — SIGNIFICANT CHANGE UP (ref 3.8–5.2)
RBC # FLD: 17 % — HIGH (ref 10.3–14.5)
RBC CASTS # UR COMP ASSIST: HIGH (ref 0–?)
SODIUM SERPL-SCNC: 139 MMOL/L — SIGNIFICANT CHANGE UP (ref 135–145)
SP GR SPEC: 1.02 — SIGNIFICANT CHANGE UP (ref 1–1.04)
SQUAMOUS # UR AUTO: SIGNIFICANT CHANGE UP
UROBILINOGEN FLD QL: NORMAL — SIGNIFICANT CHANGE UP
WBC # BLD: 14.32 K/UL — HIGH (ref 3.8–10.5)
WBC # FLD AUTO: 14.32 K/UL — HIGH (ref 3.8–10.5)
WBC UR QL: SIGNIFICANT CHANGE UP (ref 0–?)

## 2019-06-15 PROCEDURE — 93010 ELECTROCARDIOGRAM REPORT: CPT

## 2019-06-15 PROCEDURE — 99233 SBSQ HOSP IP/OBS HIGH 50: CPT | Mod: GC

## 2019-06-15 PROCEDURE — 71046 X-RAY EXAM CHEST 2 VIEWS: CPT | Mod: 26

## 2019-06-15 RX ORDER — LAMOTRIGINE 25 MG/1
25 TABLET, ORALLY DISINTEGRATING ORAL DAILY
Refills: 0 | Status: DISCONTINUED | OUTPATIENT
Start: 2019-06-15 | End: 2019-06-15

## 2019-06-15 RX ORDER — LEVETIRACETAM 250 MG/1
1000 TABLET, FILM COATED ORAL EVERY 12 HOURS
Refills: 0 | Status: DISCONTINUED | OUTPATIENT
Start: 2019-06-15 | End: 2019-06-15

## 2019-06-15 RX ORDER — LAMOTRIGINE 25 MG/1
25 TABLET, ORALLY DISINTEGRATING ORAL AT BEDTIME
Refills: 0 | Status: DISCONTINUED | OUTPATIENT
Start: 2019-06-16 | End: 2019-06-18

## 2019-06-15 RX ORDER — HYDRALAZINE HCL 50 MG
10 TABLET ORAL ONCE
Refills: 0 | Status: DISCONTINUED | OUTPATIENT
Start: 2019-06-15 | End: 2019-06-15

## 2019-06-15 RX ORDER — LEVETIRACETAM 250 MG/1
1000 TABLET, FILM COATED ORAL
Refills: 0 | Status: DISCONTINUED | OUTPATIENT
Start: 2019-06-16 | End: 2019-06-18

## 2019-06-15 RX ORDER — AMLODIPINE BESYLATE 2.5 MG/1
10 TABLET ORAL DAILY
Refills: 0 | Status: DISCONTINUED | OUTPATIENT
Start: 2019-06-16 | End: 2019-06-18

## 2019-06-15 RX ORDER — SODIUM,POTASSIUM PHOSPHATES 278-250MG
1 POWDER IN PACKET (EA) ORAL THREE TIMES A DAY
Refills: 0 | Status: COMPLETED | OUTPATIENT
Start: 2019-06-15 | End: 2019-06-16

## 2019-06-15 RX ADMIN — Medication 1 PACKET(S): at 16:01

## 2019-06-15 RX ADMIN — CARVEDILOL PHOSPHATE 6.25 MILLIGRAM(S): 80 CAPSULE, EXTENDED RELEASE ORAL at 19:22

## 2019-06-15 RX ADMIN — LEVETIRACETAM 400 MILLIGRAM(S): 250 TABLET, FILM COATED ORAL at 19:22

## 2019-06-15 RX ADMIN — Medication 1 MILLIGRAM(S): at 09:26

## 2019-06-15 RX ADMIN — Medication 1 PACKET(S): at 21:53

## 2019-06-15 RX ADMIN — LEVETIRACETAM 1000 MILLIGRAM(S): 250 TABLET, FILM COATED ORAL at 08:30

## 2019-06-15 RX ADMIN — AMLODIPINE BESYLATE 5 MILLIGRAM(S): 2.5 TABLET ORAL at 09:26

## 2019-06-15 RX ADMIN — LAMOTRIGINE 25 MILLIGRAM(S): 25 TABLET, ORALLY DISINTEGRATING ORAL at 21:53

## 2019-06-15 RX ADMIN — CARVEDILOL PHOSPHATE 6.25 MILLIGRAM(S): 80 CAPSULE, EXTENDED RELEASE ORAL at 09:26

## 2019-06-15 RX ADMIN — BENZOCAINE AND MENTHOL 1 LOZENGE: 5; 1 LIQUID ORAL at 21:53

## 2019-06-15 RX ADMIN — Medication 3 MILLIGRAM(S): at 21:53

## 2019-06-15 RX ADMIN — LISINOPRIL 5 MILLIGRAM(S): 2.5 TABLET ORAL at 08:30

## 2019-06-15 RX ADMIN — Medication 100 MILLIGRAM(S): at 16:05

## 2019-06-15 RX ADMIN — BENZOCAINE AND MENTHOL 1 LOZENGE: 5; 1 LIQUID ORAL at 16:06

## 2019-06-15 NOTE — PROGRESS NOTE ADULT - SUBJECTIVE AND OBJECTIVE BOX
Patient is a 44y old  Female who presents with a chief complaint of seizure, AMS (15 Magen 2019 08:00)      SUBJECTIVE / OVERNIGHT EVENTS:  Pt refusing blood pressure medications. She conveyed that 'I don't want to have so many meds in my system.' Pt hypertensive overnight. Pt educated about the importance of taking medications by provider and RN. Pt agreed to take medications in the AM.    MEDICATIONS  (STANDING):  amLODIPine   Tablet 5 milliGRAM(s) Oral daily  carvedilol 6.25 milliGRAM(s) Oral every 12 hours  heparin  Injectable 7500 Unit(s) SubCutaneous every 8 hours  hydrALAZINE Injectable 10 milliGRAM(s) IV Push once  levETIRAcetam  Solution 1000 milliGRAM(s) Oral two times a day  lisinopril 5 milliGRAM(s) Oral daily  LORazepam     Tablet 1 milliGRAM(s) Oral every 12 hours  LORazepam   Injectable 1 milliGRAM(s) IV Push every 12 hours  melatonin 3 milliGRAM(s) Oral at bedtime  potassium phosphate / sodium phosphate powder 1 Packet(s) Oral three times a day    MEDICATIONS  (PRN):  acetaminophen   Tablet .. 650 milliGRAM(s) Oral every 6 hours PRN Temp greater or equal to 38C (100.4F), Mild Pain (1 - 3), Moderate Pain (4 - 6), Severe Pain (7 - 10)  benzocaine 15 mG/menthol 3.6 mG (Sugar-Free) Lozenge 1 Lozenge Oral three times a day PRN Sore Throat  guaiFENesin   Syrup  (Sugar-Free) 100 milliGRAM(s) Oral every 6 hours PRN sore throat  LORazepam     Tablet 1 milliGRAM(s) Oral every 6 hours PRN Moderate anxiety  LORazepam     Tablet 2 milliGRAM(s) Oral every 6 hours PRN severe anxiety/agitation  LORazepam   Injectable 2 milliGRAM(s) IntraMuscular every 6 hours PRN severe anxiety/agitation  LORazepam   Injectable 2 milliGRAM(s) IntraMuscular every 6 hours PRN severe anxiety/agitation  LORazepam   Injectable 1 milliGRAM(s) IntraMuscular every 6 hours PRN Moderate anxiety  LORazepam   Injectable 1 milliGRAM(s) IV Push every 6 hours PRN moderate anxiety      Vital Signs Last 24 Hrs  T(C): 37.3 (15 Magen 2019 08:21), Max: 38 (14 Jun 2019 21:50)  T(F): 99.1 (15 Magen 2019 08:21), Max: 100.4 (14 Jun 2019 21:50)  HR: 86 (15 Magen 2019 09:13) (75 - 90)  BP: 151/100 (15 Magen 2019 09:13) (151/100 - 194/109)  BP(mean): --  RR: 18 (15 Magen 2019 08:21) (18 - 20)  SpO2: 100% (15 Magen 2019 08:21) (98% - 100%)    CAPILLARY BLOOD GLUCOSE        I&O's Summary      PHYSICAL EXAM:  GENERAL: NAD, mildly excitable   HEAD:  Normocephalic, atraumatic  HEENT: Moist mucous membranes  NECK: Supple, No JVD  NERVOUS SYSTEM:  Alert & Oriented X3, Motor Strength 5/5 B/L upper and lower extremities  CHEST/LUNG: Clear to auscultation bilaterally  HEART: Regular rate and rhythm, no murmur   ABDOMEN: Soft, Nontender, Nondistended, Bowel sounds present  EXTREMITIES:   No clubbing, cyanosis, or edema  MUSCULOSKELTAL- No muscle tenderness, no joint tenderness  SKIN- warm and dry, no rash     LABS:                        12.4   14.32 )-----------( 192      ( 15 Magen 2019 05:20 )             40.1     Auto Eosinophil # x     / Auto Eosinophil % x     / Auto Neutrophil # x     / Auto Neutrophil % x     / BANDS % x        06-15    139  |  105  |  10  ----------------------------<  77  3.5   |  21<L>  |  0.82    Ca    10.1      15 Magen 2019 05:20  Mg     1.8     06-15  Phos  2.4     06-15  TPro  7.4  /  Alb  4.0  /  TBili  0.6  /  DBili  x   /  AST  21  /  ALT  12  /  AlkPhos  58  06-15                RESPIRATORY  VENT:    ABG: ( 13 Jun 2019 00:56 ) pH: 7.34  /  pCO2: 34    /  pO2: 207   / HCO3: 19    / Base Excess: -6.9  /  SaO2: 99.6            ( 12 Jun 2019 20:55 ) pH: 7.23  /  pCO2: 44    /  pO2: 132   / HCO3: 17    / Base Excess: -8.4  /  SaO2: 97.7            ( 12 Jun 2019 19:08 ) pH: 7.05  /  pCO2: 61    /  pO2: 126   / HCO3: 13    / Base Excess: -12.8 /  SaO2: 95.7                VBG:     RADIOLOGY & ADDITIONAL TESTS:  (Imaging Personally Reviewed): OWEN    Consultant(s) Notes Reviewed:  Neurology, PT    Care Discussed with Consultants/Other Providers: OWEN Patient is a 44y old  Female who presents with a chief complaint of seizure, AMS (15 Magen 2019 08:00)      SUBJECTIVE / OVERNIGHT EVENTS:  Pt refusing blood pressure medications. She conveyed that 'I don't want to have so many meds in my system.' Pt hypertensive overnight to 170s-190s. Pt educated about the importance of taking medications by provider and RN. Pt agreed to take medications in the AM. Pt also complaining of intermittent CP.     MEDICATIONS  (STANDING):  amLODIPine   Tablet 5 milliGRAM(s) Oral daily  carvedilol 6.25 milliGRAM(s) Oral every 12 hours  heparin  Injectable 7500 Unit(s) SubCutaneous every 8 hours  hydrALAZINE Injectable 10 milliGRAM(s) IV Push once  levETIRAcetam  Solution 1000 milliGRAM(s) Oral two times a day  lisinopril 5 milliGRAM(s) Oral daily  LORazepam     Tablet 1 milliGRAM(s) Oral every 12 hours  LORazepam   Injectable 1 milliGRAM(s) IV Push every 12 hours  melatonin 3 milliGRAM(s) Oral at bedtime  potassium phosphate / sodium phosphate powder 1 Packet(s) Oral three times a day    MEDICATIONS  (PRN):  acetaminophen   Tablet .. 650 milliGRAM(s) Oral every 6 hours PRN Temp greater or equal to 38C (100.4F), Mild Pain (1 - 3), Moderate Pain (4 - 6), Severe Pain (7 - 10)  benzocaine 15 mG/menthol 3.6 mG (Sugar-Free) Lozenge 1 Lozenge Oral three times a day PRN Sore Throat  guaiFENesin   Syrup  (Sugar-Free) 100 milliGRAM(s) Oral every 6 hours PRN sore throat  LORazepam     Tablet 1 milliGRAM(s) Oral every 6 hours PRN Moderate anxiety  LORazepam     Tablet 2 milliGRAM(s) Oral every 6 hours PRN severe anxiety/agitation  LORazepam   Injectable 2 milliGRAM(s) IntraMuscular every 6 hours PRN severe anxiety/agitation  LORazepam   Injectable 2 milliGRAM(s) IntraMuscular every 6 hours PRN severe anxiety/agitation  LORazepam   Injectable 1 milliGRAM(s) IntraMuscular every 6 hours PRN Moderate anxiety  LORazepam   Injectable 1 milliGRAM(s) IV Push every 6 hours PRN moderate anxiety      Vital Signs Last 24 Hrs  T(C): 37.3 (15 Magen 2019 08:21), Max: 38 (14 Jun 2019 21:50)  T(F): 99.1 (15 Magen 2019 08:21), Max: 100.4 (14 Jun 2019 21:50)  HR: 86 (15 Magen 2019 09:13) (75 - 90)  BP: 151/100 (15 Magen 2019 09:13) (151/100 - 194/109)  BP(mean): --  RR: 18 (15 Magen 2019 08:21) (18 - 20)  SpO2: 100% (15 Magen 2019 08:21) (98% - 100%)    CAPILLARY BLOOD GLUCOSE        I&O's Summary      PHYSICAL EXAM:  GENERAL: NAD, mildly excitable   HEAD:  Normocephalic, atraumatic  HEENT: Moist mucous membranes  NECK: Supple, No JVD  NERVOUS SYSTEM:  Alert & Oriented X3, Motor Strength 5/5 B/L upper and lower extremities  CHEST/LUNG: Clear to auscultation bilaterally  HEART: Regular rate and rhythm, no murmur   ABDOMEN: Soft, Nontender, Nondistended, Bowel sounds present  EXTREMITIES:   No clubbing, cyanosis, or edema  MUSCULOSKELTAL- No muscle tenderness, no joint tenderness  SKIN- warm and dry, no rash     LABS:                        12.4   14.32 )-----------( 192      ( 15 Magen 2019 05:20 )             40.1     Auto Eosinophil # x     / Auto Eosinophil % x     / Auto Neutrophil # x     / Auto Neutrophil % x     / BANDS % x        06-15    139  |  105  |  10  ----------------------------<  77  3.5   |  21<L>  |  0.82    Ca    10.1      15 Magen 2019 05:20  Mg     1.8     06-15  Phos  2.4     06-15  TPro  7.4  /  Alb  4.0  /  TBili  0.6  /  DBili  x   /  AST  21  /  ALT  12  /  AlkPhos  58  06-15                RESPIRATORY  VENT:    ABG: ( 13 Jun 2019 00:56 ) pH: 7.34  /  pCO2: 34    /  pO2: 207   / HCO3: 19    / Base Excess: -6.9  /  SaO2: 99.6            ( 12 Jun 2019 20:55 ) pH: 7.23  /  pCO2: 44    /  pO2: 132   / HCO3: 17    / Base Excess: -8.4  /  SaO2: 97.7            ( 12 Jun 2019 19:08 ) pH: 7.05  /  pCO2: 61    /  pO2: 126   / HCO3: 13    / Base Excess: -12.8 /  SaO2: 95.7                VBG:     RADIOLOGY & ADDITIONAL TESTS:  (Imaging Personally Reviewed): OWEN    Consultant(s) Notes Reviewed:  Neurology, PT    Care Discussed with Consultants/Other Providers: OWEN Patient is a 44y old  Female who presents with a chief complaint of seizure, AMS (15 Magen 2019 08:00)      SUBJECTIVE / OVERNIGHT EVENTS:  Pt refusing blood pressure medications. She conveyed that 'I don't want to have so many meds in my system.' Pt hypertensive overnight to 170s-190s. Pt educated about the importance of taking medications by provider and RN. Pt agreed to take medications in the AM. Pt also complaining of intermittent CP, AM EKG not showing acute ischemic changes    MEDICATIONS  (STANDING):  amLODIPine   Tablet 5 milliGRAM(s) Oral daily  carvedilol 6.25 milliGRAM(s) Oral every 12 hours  heparin  Injectable 7500 Unit(s) SubCutaneous every 8 hours  hydrALAZINE Injectable 10 milliGRAM(s) IV Push once  levETIRAcetam  Solution 1000 milliGRAM(s) Oral two times a day  lisinopril 5 milliGRAM(s) Oral daily  LORazepam     Tablet 1 milliGRAM(s) Oral every 12 hours  LORazepam   Injectable 1 milliGRAM(s) IV Push every 12 hours  melatonin 3 milliGRAM(s) Oral at bedtime  potassium phosphate / sodium phosphate powder 1 Packet(s) Oral three times a day    MEDICATIONS  (PRN):  acetaminophen   Tablet .. 650 milliGRAM(s) Oral every 6 hours PRN Temp greater or equal to 38C (100.4F), Mild Pain (1 - 3), Moderate Pain (4 - 6), Severe Pain (7 - 10)  benzocaine 15 mG/menthol 3.6 mG (Sugar-Free) Lozenge 1 Lozenge Oral three times a day PRN Sore Throat  guaiFENesin   Syrup  (Sugar-Free) 100 milliGRAM(s) Oral every 6 hours PRN sore throat  LORazepam     Tablet 1 milliGRAM(s) Oral every 6 hours PRN Moderate anxiety  LORazepam     Tablet 2 milliGRAM(s) Oral every 6 hours PRN severe anxiety/agitation  LORazepam   Injectable 2 milliGRAM(s) IntraMuscular every 6 hours PRN severe anxiety/agitation  LORazepam   Injectable 2 milliGRAM(s) IntraMuscular every 6 hours PRN severe anxiety/agitation  LORazepam   Injectable 1 milliGRAM(s) IntraMuscular every 6 hours PRN Moderate anxiety  LORazepam   Injectable 1 milliGRAM(s) IV Push every 6 hours PRN moderate anxiety      Vital Signs Last 24 Hrs  T(C): 37.3 (15 Magen 2019 08:21), Max: 38 (14 Jun 2019 21:50)  T(F): 99.1 (15 Magen 2019 08:21), Max: 100.4 (14 Jun 2019 21:50)  HR: 86 (15 Magen 2019 09:13) (75 - 90)  BP: 151/100 (15 Magen 2019 09:13) (151/100 - 194/109)  BP(mean): --  RR: 18 (15 Magen 2019 08:21) (18 - 20)  SpO2: 100% (15 Magen 2019 08:21) (98% - 100%)    CAPILLARY BLOOD GLUCOSE        I&O's Summary      PHYSICAL EXAM:  GENERAL: NAD, mildly excitable   HEAD:  Normocephalic, atraumatic  HEENT: Moist mucous membranes  NECK: Supple, No JVD  NERVOUS SYSTEM:  Alert & Oriented X3, Motor Strength 5/5 B/L upper and lower extremities  CHEST/LUNG: Clear to auscultation bilaterally  HEART: Regular rate and rhythm, no murmur   ABDOMEN: Soft, Nontender, Nondistended, Bowel sounds present  EXTREMITIES:   No clubbing, cyanosis, or edema  MUSCULOSKELTAL- No muscle tenderness, no joint tenderness  SKIN- warm and dry, no rash     LABS:                        12.4   14.32 )-----------( 192      ( 15 Magen 2019 05:20 )             40.1     Auto Eosinophil # x     / Auto Eosinophil % x     / Auto Neutrophil # x     / Auto Neutrophil % x     / BANDS % x        06-15    139  |  105  |  10  ----------------------------<  77  3.5   |  21<L>  |  0.82    Ca    10.1      15 Magen 2019 05:20  Mg     1.8     06-15  Phos  2.4     06-15  TPro  7.4  /  Alb  4.0  /  TBili  0.6  /  DBili  x   /  AST  21  /  ALT  12  /  AlkPhos  58  06-15                RESPIRATORY  VENT:    ABG: ( 13 Jun 2019 00:56 ) pH: 7.34  /  pCO2: 34    /  pO2: 207   / HCO3: 19    / Base Excess: -6.9  /  SaO2: 99.6            ( 12 Jun 2019 20:55 ) pH: 7.23  /  pCO2: 44    /  pO2: 132   / HCO3: 17    / Base Excess: -8.4  /  SaO2: 97.7            ( 12 Jun 2019 19:08 ) pH: 7.05  /  pCO2: 61    /  pO2: 126   / HCO3: 13    / Base Excess: -12.8 /  SaO2: 95.7                VBG:     RADIOLOGY & ADDITIONAL TESTS:  (Imaging Personally Reviewed): OWEN    Consultant(s) Notes Reviewed:  Neurology, PT    Care Discussed with Consultants/Other Providers: OWEN Patient is a 44y old  Female who presents with a chief complaint of seizure, AMS (15 Magen 2019 08:00)      SUBJECTIVE / OVERNIGHT EVENTS:  Pt refusing blood pressure medications. She conveyed that 'I don't want to have so many meds in my system.' Pt hypertensive overnight to 170s-190s. Pt educated about the importance of taking medications by provider and RN. Pt agreed to take medications in the AM. Pt also complaining of intermittent CP, AM EKG not showing acute ischemic changes. Pt febrile overnight to 100.4.  Pt complains of cough on reassessment later in the morning; she conveys that she has been coughing up blood though this was not reported previously. She denies dysuria or suprapubic tenderness.     MEDICATIONS  (STANDING):  amLODIPine   Tablet 5 milliGRAM(s) Oral daily  carvedilol 6.25 milliGRAM(s) Oral every 12 hours  heparin  Injectable 7500 Unit(s) SubCutaneous every 8 hours  hydrALAZINE Injectable 10 milliGRAM(s) IV Push once  levETIRAcetam  Solution 1000 milliGRAM(s) Oral two times a day  lisinopril 5 milliGRAM(s) Oral daily  LORazepam     Tablet 1 milliGRAM(s) Oral every 12 hours  LORazepam   Injectable 1 milliGRAM(s) IV Push every 12 hours  melatonin 3 milliGRAM(s) Oral at bedtime  potassium phosphate / sodium phosphate powder 1 Packet(s) Oral three times a day    MEDICATIONS  (PRN):  acetaminophen   Tablet .. 650 milliGRAM(s) Oral every 6 hours PRN Temp greater or equal to 38C (100.4F), Mild Pain (1 - 3), Moderate Pain (4 - 6), Severe Pain (7 - 10)  benzocaine 15 mG/menthol 3.6 mG (Sugar-Free) Lozenge 1 Lozenge Oral three times a day PRN Sore Throat  guaiFENesin   Syrup  (Sugar-Free) 100 milliGRAM(s) Oral every 6 hours PRN sore throat  LORazepam     Tablet 1 milliGRAM(s) Oral every 6 hours PRN Moderate anxiety  LORazepam     Tablet 2 milliGRAM(s) Oral every 6 hours PRN severe anxiety/agitation  LORazepam   Injectable 2 milliGRAM(s) IntraMuscular every 6 hours PRN severe anxiety/agitation  LORazepam   Injectable 2 milliGRAM(s) IntraMuscular every 6 hours PRN severe anxiety/agitation  LORazepam   Injectable 1 milliGRAM(s) IntraMuscular every 6 hours PRN Moderate anxiety  LORazepam   Injectable 1 milliGRAM(s) IV Push every 6 hours PRN moderate anxiety      Vital Signs Last 24 Hrs  T(C): 37.3 (15 Magen 2019 08:21), Max: 38 (14 Jun 2019 21:50)  T(F): 99.1 (15 Magen 2019 08:21), Max: 100.4 (14 Jun 2019 21:50)  HR: 86 (15 Magen 2019 09:13) (75 - 90)  BP: 151/100 (15 Magen 2019 09:13) (151/100 - 194/109)  BP(mean): --  RR: 18 (15 Magen 2019 08:21) (18 - 20)  SpO2: 100% (15 Magen 2019 08:21) (98% - 100%)    CAPILLARY BLOOD GLUCOSE        I&O's Summary      PHYSICAL EXAM:  GENERAL: NAD, mildly excitable   HEAD:  Normocephalic, atraumatic  HEENT: Moist mucous membranes  NECK: Supple, No JVD  NERVOUS SYSTEM:  Alert & Oriented X3, Motor Strength 5/5 B/L upper and lower extremities  CHEST/LUNG: Clear to auscultation bilaterally  HEART: Regular rate and rhythm, no murmur   ABDOMEN: Soft, Nontender, Nondistended, Bowel sounds present  EXTREMITIES:   No clubbing, cyanosis, or edema  MUSCULOSKELTAL- No muscle tenderness, no joint tenderness  SKIN- warm and dry, no rash     LABS:                        12.4   14.32 )-----------( 192      ( 15 Magen 2019 05:20 )             40.1     Auto Eosinophil # x     / Auto Eosinophil % x     / Auto Neutrophil # x     / Auto Neutrophil % x     / BANDS % x        06-15    139  |  105  |  10  ----------------------------<  77  3.5   |  21<L>  |  0.82    Ca    10.1      15 Magen 2019 05:20  Mg     1.8     06-15  Phos  2.4     06-15  TPro  7.4  /  Alb  4.0  /  TBili  0.6  /  DBili  x   /  AST  21  /  ALT  12  /  AlkPhos  58  06-15                RESPIRATORY  VENT:    ABG: ( 13 Jun 2019 00:56 ) pH: 7.34  /  pCO2: 34    /  pO2: 207   / HCO3: 19    / Base Excess: -6.9  /  SaO2: 99.6            ( 12 Jun 2019 20:55 ) pH: 7.23  /  pCO2: 44    /  pO2: 132   / HCO3: 17    / Base Excess: -8.4  /  SaO2: 97.7            ( 12 Jun 2019 19:08 ) pH: 7.05  /  pCO2: 61    /  pO2: 126   / HCO3: 13    / Base Excess: -12.8 /  SaO2: 95.7                VBG:     RADIOLOGY & ADDITIONAL TESTS:  (Imaging Personally Reviewed): OWEN    Consultant(s) Notes Reviewed:  Neurology, PT    Care Discussed with Consultants/Other Providers: OWEN

## 2019-06-15 NOTE — PROVIDER CONTACT NOTE (MEDICATION) - ACTION/TREATMENT ORDERED:
Dr. Meza came to bedside and spoke with patient. Patient agreeing to take PO coreg and IV keppra at this time. Will continue to monitor and educate patient.

## 2019-06-15 NOTE — PROGRESS NOTE ADULT - PROBLEM SELECTOR PLAN 6
hx of LV thrombus s/p thrombectomy in 2015; prescribed coumadin but according to the daughter, patient has been taken off the coumadin by a provider less than a year ago

## 2019-06-15 NOTE — PROVIDER CONTACT NOTE (OTHER) - ACTION/TREATMENT ORDERED:
No interventions were ordered. Continue to monitor Continue to monitor BP No interventions were ordered. Tylenol was ordered

## 2019-06-15 NOTE — PROVIDER CONTACT NOTE (MEDICATION) - ACTION/TREATMENT ORDERED:
MD Jackson made aware; no further interventions needed at this time. Will continue to educate patient.

## 2019-06-15 NOTE — PROVIDER CONTACT NOTE (MEDICATION) - BACKGROUND
Pateint admitted for epilepsy without status epilepticus with PMh of thrombus (previously on warfarin), seizure disorder, and asthma.

## 2019-06-15 NOTE — PROGRESS NOTE ADULT - PROBLEM SELECTOR PLAN 3
Pt with leukocytosis and tachypnea meeting SIRS criteria  Pt afebrile, with clear lungs, negative RVP, negative UA, unlikely to be of infectious etiology; leukocytosis more likely to be stress response, will monitor off Abx for now and treat empirically if spikes Pt with leukocytosis and tachypnea meeting SIRS criteria  Pt spiked a fever overnight to 100.4, CXR w/ clear lungs, negative RVP, negative UA, unlikely to be of infectious etiology; leukocytosis may be stress response  f/u BCX Pt with leukocytosis and tachypnea meeting SIRS criteria  Pt spiked a fever overnight to 100.4, CXR w/ clear lungs, negative RVP, negative UA, leukocytosis may be stress response vs VAP; can consider starting zosyn  f/u BCX Pt with leukocytosis and tachypnea meeting SIRS criteria  Pt spiked a fever overnight to 100.4, CXR w/ clear lungs, negative RVP, negative UA, leukocytosis may be stress response vs VAP  f/u BCX  will start on levaquin Pt with leukocytosis and tachypnea meeting SIRS criteria  Pt spiked a fever overnight to 100.4, CXR w/ clear lungs, negative RVP, negative UA, leukocytosis may be stress response vs VAP  f/u BCX, repeat CXR  will start on levaquin

## 2019-06-15 NOTE — PROGRESS NOTE ADULT - PROBLEM SELECTOR PLAN 4
Last ECHO in 2016 revealed EF of 40% w/ mid to distal wall severely hypokinetic and apex akinetic and aneurysmal;   repeat echo showing: EF 64% with normal LV/RV function  Will restart carvedilol 6.25 BID and lisinopril 5mg daily(on ramipril at home)  Strict I&O Last ECHO in 2016 revealed EF of 40% w/ mid to distal wall severely hypokinetic and apex akinetic and aneurysmal;   repeat echo showing: EF 64% with normal LV/RV function  Restarted carvedilol 6.25 BID and lisinopril 5mg daily(on ramipril at home)  Strict I&O
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
## 2019-06-15 NOTE — PROGRESS NOTE ADULT - PROBLEM SELECTOR PLAN 5
c/w carvedilol 6.25 BID, lisinopril 5mg daily and amlodipine 5mg daily; pt hypertensive because she has been refusing meds
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
## 2019-06-15 NOTE — PROVIDER CONTACT NOTE (MEDICATION) - ASSESSMENT
Patient refusing heparin 1400  despite education on purpose of heparin. Patient states that she does not want it because she walks around.

## 2019-06-15 NOTE — PROGRESS NOTE ADULT - PROBLEM SELECTOR PLAN 2
Pt seen by psych, who convey that pt may have paranoid personality disorder in addition to possible delirium and lacks capacity to leave AMA or refuse her medications

## 2019-06-15 NOTE — PROVIDER CONTACT NOTE (MEDICATION) - SITUATION
Patient refusing PO lamictal and levaquin despite education on necessity of medications. Patient was also refusing PO coreg and keppra prior to Dr. Meza coming to bedside to convince patient.

## 2019-06-16 ENCOUNTER — TRANSCRIPTION ENCOUNTER (OUTPATIENT)
Age: 44
End: 2019-06-16

## 2019-06-16 LAB
L PNEUMO AG UR QL: NEGATIVE — SIGNIFICANT CHANGE UP
SPECIMEN SOURCE: SIGNIFICANT CHANGE UP
SPECIMEN SOURCE: SIGNIFICANT CHANGE UP

## 2019-06-16 PROCEDURE — 99233 SBSQ HOSP IP/OBS HIGH 50: CPT | Mod: GC

## 2019-06-16 PROCEDURE — 70553 MRI BRAIN STEM W/O & W/DYE: CPT | Mod: 26

## 2019-06-16 RX ORDER — CARVEDILOL PHOSPHATE 80 MG/1
12.5 CAPSULE, EXTENDED RELEASE ORAL EVERY 12 HOURS
Refills: 0 | Status: DISCONTINUED | OUTPATIENT
Start: 2019-06-16 | End: 2019-06-18

## 2019-06-16 RX ADMIN — BENZOCAINE AND MENTHOL 1 LOZENGE: 5; 1 LIQUID ORAL at 22:14

## 2019-06-16 RX ADMIN — BENZOCAINE AND MENTHOL 1 LOZENGE: 5; 1 LIQUID ORAL at 06:22

## 2019-06-16 RX ADMIN — CARVEDILOL PHOSPHATE 12.5 MILLIGRAM(S): 80 CAPSULE, EXTENDED RELEASE ORAL at 18:25

## 2019-06-16 RX ADMIN — Medication 100 MILLIGRAM(S): at 22:14

## 2019-06-16 RX ADMIN — LEVETIRACETAM 1000 MILLIGRAM(S): 250 TABLET, FILM COATED ORAL at 06:22

## 2019-06-16 RX ADMIN — LISINOPRIL 5 MILLIGRAM(S): 2.5 TABLET ORAL at 06:22

## 2019-06-16 RX ADMIN — AMLODIPINE BESYLATE 10 MILLIGRAM(S): 2.5 TABLET ORAL at 06:22

## 2019-06-16 RX ADMIN — LEVETIRACETAM 1000 MILLIGRAM(S): 250 TABLET, FILM COATED ORAL at 18:25

## 2019-06-16 RX ADMIN — CARVEDILOL PHOSPHATE 6.25 MILLIGRAM(S): 80 CAPSULE, EXTENDED RELEASE ORAL at 06:22

## 2019-06-16 RX ADMIN — BENZOCAINE AND MENTHOL 1 LOZENGE: 5; 1 LIQUID ORAL at 09:08

## 2019-06-16 RX ADMIN — Medication 100 MILLIGRAM(S): at 09:08

## 2019-06-16 RX ADMIN — Medication 100 MILLIGRAM(S): at 01:49

## 2019-06-16 RX ADMIN — Medication 1 PACKET(S): at 06:22

## 2019-06-16 RX ADMIN — LAMOTRIGINE 25 MILLIGRAM(S): 25 TABLET, ORALLY DISINTEGRATING ORAL at 21:21

## 2019-06-16 NOTE — PROGRESS NOTE ADULT - SUBJECTIVE AND OBJECTIVE BOX
Patient is a 44y old  Female who presents with a chief complaint of seizure, AMS (16 Jun 2019 07:41). no further seizures.      NEUROLOGIC EXAM  Mental Status:     Oriented to time/place/person; Good eye contact ; follow simple commands ;  Age appropriate language  and fund of  knowledge.  Cranial Nerves:   PERRL, EOMI, no facial asymmetry , V1-V3 intact , symmetric palate, tongue midline.   Muscle Strength:	5/5 in all extremities  Muscle Tone:	Normal tone  Deep Tendon Reflexes:         2+/4  : Biceps, Brachioradialis, Triceps Bilateral;  2+/4 : Pattelar, Ankle bilateral. No clonus.  Plantar Response:	Plantar reflexes flexion bilaterally  Sensation:		Intact to pain, light touch, temperature and vibration throughout.  Coordination/	No dysmetria in finger to nose test bilaterally  Cerebellum	  Tandem Gait/Romberg	Normal gait       Vital Signs Last 24 Hrs  T(C): 37.4 (16 Jun 2019 05:08), Max: 37.4 (16 Jun 2019 05:08)  T(F): 99.4 (16 Jun 2019 05:08), Max: 99.4 (16 Jun 2019 05:08)  HR: 69 (16 Jun 2019 05:08) (69 - 72)  BP: 161/102 (16 Jun 2019 05:08) (149/97 - 171/87)  BP(mean): --  RR: 16 (16 Jun 2019 05:08) (16 - 19)  SpO2: 100% (16 Jun 2019 05:08) (99% - 100%)  MEDICATIONS  (STANDING):  amLODIPine   Tablet 10 milliGRAM(s) Oral daily  carvedilol 6.25 milliGRAM(s) Oral every 12 hours  lamoTRIgine 25 milliGRAM(s) Oral at bedtime  levETIRAcetam 1000 milliGRAM(s) Oral two times a day  levoFLOXacin  Tablet 750 milliGRAM(s) Oral every 24 hours  lisinopril 5 milliGRAM(s) Oral daily  melatonin 3 milliGRAM(s) Oral at bedtime    MEDICATIONS  (PRN):  acetaminophen   Tablet .. 650 milliGRAM(s) Oral every 6 hours PRN Temp greater or equal to 38C (100.4F), Mild Pain (1 - 3), Moderate Pain (4 - 6), Severe Pain (7 - 10)  benzocaine 15 mG/menthol 3.6 mG (Sugar-Free) Lozenge 1 Lozenge Oral three times a day PRN Sore Throat  guaiFENesin   Syrup  (Sugar-Free) 100 milliGRAM(s) Oral every 6 hours PRN sore throat  LORazepam     Tablet 1 milliGRAM(s) Oral every 6 hours PRN Moderate anxiety  LORazepam     Tablet 2 milliGRAM(s) Oral every 6 hours PRN severe anxiety/agitation  LORazepam   Injectable 2 milliGRAM(s) IntraMuscular every 6 hours PRN severe anxiety/agitation  LORazepam   Injectable 2 milliGRAM(s) IntraMuscular every 6 hours PRN severe anxiety/agitation  LORazepam   Injectable 1 milliGRAM(s) IntraMuscular every 6 hours PRN Moderate anxiety  LORazepam   Injectable 1 milliGRAM(s) IV Push every 6 hours PRN moderate anxiety      Abnormal EEG in a AMS patient.  - Mild to moderate generalized slowing.  _____________________________________________________________  EEG IMPRESSION/CLINICAL CORRELATE    Abnormal EEG study.  1. Mild to moderate nonspecific diffuse or multifocal cerebral dysfunction.   2. No epileptiform pattern or seizure seen.

## 2019-06-16 NOTE — PROVIDER CONTACT NOTE (OTHER) - ASSESSMENT
Patient was education on all of her medication and explained the side effects and the importance of staying compliant with seizure medications.

## 2019-06-16 NOTE — PROGRESS NOTE ADULT - PROBLEM SELECTOR PLAN 1
Continue Keppra. Started on lamictal , patient will titrate up slowly as outpatient.   f/u MRI of brain. EEG showed some slowing, no active seizures.   Seizure precautions.  patient cannot drive till seizure free for one year.

## 2019-06-16 NOTE — PROGRESS NOTE ADULT - SUBJECTIVE AND OBJECTIVE BOX
Progress Note    JARED LAMAS 44y (1975) Female [id number]  19 (4d)    Dr. Hermelinda Aguilera, [facility] PGY2  Pager# [phone]    Chief Complaint: seizure, AMS    Subjective:  Patient continues to refuse to take her medications. Patient seen and examined at bedside.    Review of Systems:  CONSTITUTIONAL: No fever, weight loss, or fatigue  EYES: No eye pain, visual disturbances, or discharge  ENMT:  No difficulty hearing, tinnitus, vertigo; No sinus or throat pain  NECK: No pain or stiffness  RESPIRATORY: No cough, wheezing, chills or hemoptysis; No shortness of breath  CARDIOVASCULAR: No chest pain, palpitations, dizziness, or leg swelling  GASTROINTESTINAL: No abdominal or epigastric pain. No nausea, vomiting, or hematemesis; No diarrhea or constipation. No melena or hematochezia.  GENITOURINARY: No dysuria, frequency, hematuria, or incontinence  NEUROLOGICAL: No headaches, memory loss, loss of strength, numbness, or tremors  SKIN: No itching, burning, rashes, or lesions   MUSCULOSKELETAL: No joint pain or swelling; No muscle, back, or extremity pain      PAST MEDICAL & SURGICAL HISTORY:  Seizure disorder (G40.909)  MRSA (methicillin resistant Staphylococcus aureus) (A49.02)  Thrombus (453.9): LV thrombus s/p emergent thrombectomy  NSTEMI (non-ST elevated myocardial infarction) (410.70): 2014, treated at [facility].  Cardiac cath performed.  Asthma (493.90): required hospital admission in the past.  Does not take medications at home.  Hypertension (401.9)  Asthma (493.90)  Hypertension (401.9)  Rotator cuff arthropathy, left (716.91)    acetaminophen   Tablet .. 650 milliGRAM(s) Oral every 6 hours PRN  amLODIPine   Tablet 10 milliGRAM(s) Oral daily  benzocaine 15 mG/menthol 3.6 mG (Sugar-Free) Lozenge 1 Lozenge Oral three times a day PRN  carvedilol 6.25 milliGRAM(s) Oral every 12 hours  guaiFENesin   Syrup  (Sugar-Free) 100 milliGRAM(s) Oral every 6 hours PRN  heparin  Injectable 7500 Unit(s) SubCutaneous every 8 hours  lamoTRIgine 25 milliGRAM(s) Oral at bedtime  levETIRAcetam 1000 milliGRAM(s) Oral two times a day  levoFLOXacin  Tablet 750 milliGRAM(s) Oral every 24 hours  lisinopril 5 milliGRAM(s) Oral daily  LORazepam     Tablet 1 milliGRAM(s) Oral every 6 hours PRN  LORazepam     Tablet 2 milliGRAM(s) Oral every 6 hours PRN  LORazepam   Injectable 2 milliGRAM(s) IntraMuscular every 6 hours PRN  LORazepam   Injectable 2 milliGRAM(s) IntraMuscular every 6 hours PRN  LORazepam   Injectable 1 milliGRAM(s) IntraMuscular every 6 hours PRN  LORazepam   Injectable 1 milliGRAM(s) IV Push every 6 hours PRN  melatonin 3 milliGRAM(s) Oral at bedtime    Objective:  T(C): 37.4 (19 @ 05:08), Max: 37.4 (19 @ 05:08)  HR: 69 (19 @ 05:08) (69 - 86)  BP: 161/102 (19 @ 05:08) (149/97 - 171/87)  RR: 16 (19 @ 05:08) (16 - 19)  SpO2: 100% (19 @ 05:08) (99% - 100%)    Physical exam:  GENERAL: NAD, well-developed  HEAD:  Atraumatic, Normocephalic  EYES: EOMI, PERRLA, conjunctiva and sclera clear  NECK: Supple, No JVD  CHEST/LUNG: Clear to auscultation bilaterally; No wheeze  HEART: Regular rate and rhythm; No murmurs, rubs, or gallops  ABDOMEN: Soft, Nontender, Nondistended; Bowel sounds present  EXTREMITIES:  2+ Peripheral Pulses, No clubbing, cyanosis, or edema  PSYCH: AAOx2  NEUROLOGY: non-focal  SKIN: No rashes or lesions      06-15-19 @ 07:01  -  19 @ 07:00  --------------------------------------------------------  IN: 987 mL / OUT: 650 mL / NET: 337 mL        CAPILLARY BLOOD GLUCOSE      (06-15 @ 05:20)                      12.4  14.32 )-----------( 192                 40.1    Neutrophils = -- (--%)  Lymphocytes = -- (--%)  Eosinophils = -- (--%)  Basophils = -- (--%)  Monocytes = -- (--%)  Bands = --%    06-15    139  |  105  |  10  ----------------------------<  77  3.5   |  21<L>  |  0.82    Ca    10.1      15 Magen 2019 05:20  Phos  2.4     06-15  Mg     1.8     -15    TPro  7.4  /  Alb  4.0  /  TBili  0.6  /  DBili  x   /  AST  21  /  ALT  12  /  AlkPhos  58  -15      Tox:   ( @ 18:00)  Acetaminophen Level, Serum: < 15.0 [15 - 25]  Barbiturates Measurement: --  Benzodiazepines: --  Ethanol, Whole Blood: < 10  Salicylate Level, Serum: < 5.0 [15 - 30]        Urinalysis Basic - ( 15 Magen 2019 19:30 )    Color: YELLOW / Appearance: CLEAR / S.021 / pH: 6.5  Gluc: NEGATIVE / Ketone: NEGATIVE  / Bili: NEGATIVE / Urobili: NORMAL   Blood: MODERATE / Protein: 30 / Nitrite: NEGATIVE   Leuk Esterase: NEGATIVE / RBC: 11-25 / WBC 3-5   Sq Epi: OCC / Non Sq Epi: x / Bacteria: NEGATIVE        WBC Trend: 14.32<--, 13.00<--, 9.09<--    Hb Trend: 12.4<--, 12.9<--, 15.2<--        New imaging in last 24 hours:  Consult notes reviewed:

## 2019-06-16 NOTE — PROGRESS NOTE ADULT - PROBLEM SELECTOR PLAN 7
DVT ppx: heparin subq  PT: home w/ no PT  Pt should not be driving in the setting of seizures and will need coordination with SW to notify DMV DVT ppx: improve score low  PT: home w/ no PT  Pt should not be driving in the setting of seizures and will need coordination with SW to notify DMV

## 2019-06-16 NOTE — PROVIDER CONTACT NOTE (OTHER) - ACTION/TREATMENT ORDERED:
MD Cornejo made aware. Will continue to monitor patient. No further interventions needed at this time.
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
## 2019-06-16 NOTE — PROGRESS NOTE ADULT - PROBLEM SELECTOR PLAN 3
Pt with leukocytosis and tachypnea meeting SIRS criteria  Pt spiked a fever 6/15 to 100.4, CXR w/ clear lungs, negative RVP, negative UA, leukocytosis may be stress response vs VAP  f/u BCX, repeat CXR without PNA  Levaquin 750mg QD for 5 days

## 2019-06-17 LAB
HCT VFR BLD CALC: 40.1 % — SIGNIFICANT CHANGE UP (ref 34.5–45)
HGB BLD-MCNC: 12.5 G/DL — SIGNIFICANT CHANGE UP (ref 11.5–15.5)
MCHC RBC-ENTMCNC: 25.1 PG — LOW (ref 27–34)
MCHC RBC-ENTMCNC: 31.2 % — LOW (ref 32–36)
MCV RBC AUTO: 80.4 FL — SIGNIFICANT CHANGE UP (ref 80–100)
NRBC # FLD: 0 K/UL — SIGNIFICANT CHANGE UP (ref 0–0)
PLATELET # BLD AUTO: 212 K/UL — SIGNIFICANT CHANGE UP (ref 150–400)
PMV BLD: 13.9 FL — HIGH (ref 7–13)
RBC # BLD: 4.99 M/UL — SIGNIFICANT CHANGE UP (ref 3.8–5.2)
RBC # FLD: 17.3 % — HIGH (ref 10.3–14.5)
WBC # BLD: 12.18 K/UL — HIGH (ref 3.8–10.5)
WBC # FLD AUTO: 12.18 K/UL — HIGH (ref 3.8–10.5)

## 2019-06-17 PROCEDURE — 99232 SBSQ HOSP IP/OBS MODERATE 35: CPT

## 2019-06-17 PROCEDURE — 99233 SBSQ HOSP IP/OBS HIGH 50: CPT | Mod: GC

## 2019-06-17 RX ORDER — HYDRALAZINE HCL 50 MG
5 TABLET ORAL ONCE
Refills: 0 | Status: COMPLETED | OUTPATIENT
Start: 2019-06-17 | End: 2019-06-17

## 2019-06-17 RX ADMIN — Medication 100 MILLIGRAM(S): at 09:36

## 2019-06-17 RX ADMIN — LEVETIRACETAM 1000 MILLIGRAM(S): 250 TABLET, FILM COATED ORAL at 08:21

## 2019-06-17 RX ADMIN — LAMOTRIGINE 25 MILLIGRAM(S): 25 TABLET, ORALLY DISINTEGRATING ORAL at 21:08

## 2019-06-17 RX ADMIN — CARVEDILOL PHOSPHATE 12.5 MILLIGRAM(S): 80 CAPSULE, EXTENDED RELEASE ORAL at 06:14

## 2019-06-17 RX ADMIN — BENZOCAINE AND MENTHOL 1 LOZENGE: 5; 1 LIQUID ORAL at 09:36

## 2019-06-17 RX ADMIN — BENZOCAINE AND MENTHOL 1 LOZENGE: 5; 1 LIQUID ORAL at 18:14

## 2019-06-17 RX ADMIN — LISINOPRIL 5 MILLIGRAM(S): 2.5 TABLET ORAL at 06:14

## 2019-06-17 RX ADMIN — AMLODIPINE BESYLATE 10 MILLIGRAM(S): 2.5 TABLET ORAL at 06:14

## 2019-06-17 RX ADMIN — CARVEDILOL PHOSPHATE 12.5 MILLIGRAM(S): 80 CAPSULE, EXTENDED RELEASE ORAL at 18:08

## 2019-06-17 RX ADMIN — Medication 5 MILLIGRAM(S): at 06:14

## 2019-06-17 RX ADMIN — LEVETIRACETAM 1000 MILLIGRAM(S): 250 TABLET, FILM COATED ORAL at 20:15

## 2019-06-17 NOTE — PROGRESS NOTE ADULT - PROBLEM SELECTOR PLAN 3
Pt with leukocytosis and tachypnea meeting SIRS criteria - now stable.  - Pt spiked a fever 6/15 to 100.4, CXR w/ clear lungs, negative RVP, negative UA, leukocytosis may be stress response vs VAP  f/u BCX - NGTD  - repeat CXR without PNA  Levaquin 750mg QD for 5 days Pt with leukocytosis and tachypnea meeting SIRS criteria - now stable.  - Pt spiked a fever 6/15 to 100.4, CXR w/ clear lungs, negative RVP, negative UA, leukocytosis may be stress response vs VAP  - f/u BCX 6/15 - NGTD  - repeat CXR without PNA  - Levaquin 750mg QD for 5 days - 6/16 to 6/20.
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
## 2019-06-17 NOTE — DISCHARGE NOTE PROVIDER - CONDITIONS AT DISCHARGE
Leaving prior to completion of diagnostic testing, but medically stable to discharge to outpatient follow up.

## 2019-06-17 NOTE — PROVIDER CONTACT NOTE (OTHER) - ACTION/TREATMENT ORDERED:
MD Briseno made aware. States no intervention needed at this time. Will continue to monitor and educate.

## 2019-06-17 NOTE — PROGRESS NOTE BEHAVIORAL HEALTH - CASE SUMMARY
Patient seen with resident, agree with above. No events or prns over weekend, pt appears currently at her mental status baseline, no delirium seen on today's exam. SW/primary team in process of making report to DM[employer]. Will follow.
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
## 2019-06-17 NOTE — DISCHARGE NOTE PROVIDER - PROVIDER TOKENS
PROVIDER:[TOKEN:[[id number]]] PROVIDER:[TOKEN:[[id number]]],PROVIDER:[TOKEN:[[id number]]]
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
## 2019-06-17 NOTE — DISCHARGE NOTE PROVIDER - NSDCFUADDAPPT_GEN_ALL_CORE_FT
Please call the [facility] Epilepsy clinic to schedule a follow up or follow up with your previous neurologist. Please call the [facility] Epilepsy clinic to schedule a follow up or follow up with your previous neurologist, Dr. Mattson.    You are scheduled for an appointment with Dr. Smart next Tuesday at noon. PLease see her for further follow up.

## 2019-06-17 NOTE — PROGRESS NOTE BEHAVIORAL HEALTH - NSBHCONSULTRECOMMENDOTHER_PSY_A_CORE FT
- Pt encouraged to accept Keppra doses. 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 If she does not, pt lacks capacity to refuse Keppra- although primary team would be unlikely to force pt to accept this oral medication    - SW involvement to report pt to [facility] as her occupation is a  for children. Pt should be kept in hospital until this report is fully made to ensure a safe discharge.

## 2019-06-17 NOTE — PROGRESS NOTE ADULT - PROBLEM SELECTOR PLAN 4
Last ECHO in 2016 revealed EF of 40% w/ mid to distal wall severely hypokinetic and apex akinetic and aneurysmal;   repeat echo showing: EF 64% with normal LV/RV function  - Restarted carvedilol 6.25 BID and lisinopril 5mg daily(on ramipril at home)  - Strict I&O

## 2019-06-17 NOTE — CHART NOTE - NSCHARTNOTEFT_GEN_A_CORE
MRI Reviewed. Likely benign    Recommendations:  - Can consider CTA/MRA to r/o aneurysm  - Continue ASA   - No further neurological w/u.     < from: MR Head w/wo IV Cont (06.16.19 @ 12:23) >  Abnormal susceptibility seen involving the right parasellar region.   Please note the possibility of old hemorrhage, mineralization cavernoma   or underlying aneurysm cannot be entirely excluded. MRA of the Teller   Garcia can be done to better evaluate for underlying aneurysm.     Evaluation of the diffusion weighted sequence demonstrates no abnormal   areas of restricted diffusion to suggest acute infarct.    The visualized paranasal sinuses mastoid and middle ear appear clear.    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    Abnormal susceptibility seen involving the right suprasellar region as   described above.    JALEESA ESCUDERO M.D., ATTENDING RADIOLOGIST  This document has been electronically signed. Jun 17 2019  9:57AM  < end of copied text >
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
## 2019-06-17 NOTE — DISCHARGE NOTE PROVIDER - CARE PROVIDER_API CALL
Soraya Mattson)  Neurology  [address] [place] [address]  Phone: [phone]  Fax: [phone]  Follow Up Time: Soraya Mattson)  Neurology  [address] [place] [address]  Phone: [phone]  Fax: [phone]  Follow Up Time:     Maury Smart)  Internal Medicine  [address] [place] [address]  Phone: [phone]  Fax: [phone]  Follow Up Time:

## 2019-06-17 NOTE — PROGRESS NOTE ADULT - SUBJECTIVE AND OBJECTIVE BOX
Patient is a 44y old  Female who presents with a chief complaint of seizure, AMS (2019 00:36)      SUBJECTIVE / OVERNIGHT EVENTS: Pt agitated and refusing medical care overnight. Pt HTN ON given IV hydralazine 5mg x1 - refused BP monitoring after. Patient denies F/C, HA, CP, SOB, abdominal pain, N/V/D/C.     MEDICATIONS  (STANDING):  amLODIPine   Tablet 10 milliGRAM(s) Oral daily  carvedilol 12.5 milliGRAM(s) Oral every 12 hours  lamoTRIgine 25 milliGRAM(s) Oral at bedtime  levETIRAcetam 1000 milliGRAM(s) Oral two times a day  levoFLOXacin  Tablet 750 milliGRAM(s) Oral every 24 hours  lisinopril 5 milliGRAM(s) Oral daily  melatonin 3 milliGRAM(s) Oral at bedtime    MEDICATIONS  (PRN):  acetaminophen   Tablet .. 650 milliGRAM(s) Oral every 6 hours PRN Temp greater or equal to 38C (100.4F), Mild Pain (1 - 3), Moderate Pain (4 - 6), Severe Pain (7 - 10)  benzocaine 15 mG/menthol 3.6 mG (Sugar-Free) Lozenge 1 Lozenge Oral three times a day PRN Sore Throat  guaiFENesin   Syrup  (Sugar-Free) 100 milliGRAM(s) Oral every 6 hours PRN sore throat  LORazepam     Tablet 1 milliGRAM(s) Oral every 6 hours PRN Moderate anxiety  LORazepam     Tablet 2 milliGRAM(s) Oral every 6 hours PRN severe anxiety/agitation  LORazepam   Injectable 2 milliGRAM(s) IntraMuscular every 6 hours PRN severe anxiety/agitation  LORazepam   Injectable 2 milliGRAM(s) IntraMuscular every 6 hours PRN severe anxiety/agitation  LORazepam   Injectable 1 milliGRAM(s) IntraMuscular every 6 hours PRN Moderate anxiety  LORazepam   Injectable 1 milliGRAM(s) IV Push every 6 hours PRN moderate anxiety      T(F): 98.9 ( @ 05:24), Max: 99.2 ( @ 22:13)  HR: 65 ( @ 05:24) (62 - 83)  BP: 187/110 ( @ 05:49) (146/76 - 187/110)  RR: 16 ( @ 05:24) (16 - )  SpO2: 99% ( @ 05:24) (99% - 100%)    CAPILLARY BLOOD GLUCOSE        I&O's Summary    2019 07:01  -  2019 07:00  --------------------------------------------------------  IN: 1257 mL / OUT: 1400 mL / NET: -143 mL      PHYSICAL EXAM:  GEN: Appears in no acute distress  HEENT: PERRLA, EOMI and accommodate, neck supple, no lymphadenopathy, no JVD  MOUTH: moist mucous membranes, no exudates or lesions   PULM: Clear to auscultation bilaterally, no wheezes, rales, rhonchi  CV: Regular rate and rhythm, S1S2, no murmurs, rubs or gallops  ABD: Soft, nontender to palpation, non-distended, normoactive bowel sounds  EXTREMITIES: No edema, + peripheral pulses  NEURO: AAOx3, moving all four extremities spontaneously  SKIN: No rashes, lesions, hematomas, ecchymosis    LABS & IMAGING:  Labs personally reviewed [X]                          12.5    )-----------( 212      ( 2019 06:55 )             40.1       Hgb Trend: 12.4<--, 12.9<--, 15.2<--        Creatinine Trend: 0.82<--, 1.09<--, 1.43<--, 1.08<--      Serum Pro-Brain Natriuretic Peptide (06.15.19 @ 05:20)    Serum Pro-Brain Natriuretic Peptide: 1889    Serum Pro-Brain Natriuretic Peptide (19 @ 19:18)    Serum Pro-Brain Natriuretic Peptide: 486.6    < from: Transthoracic Echocardiogram (19 @ 13:46) >  OBSERVATIONS:  Mitral Valve: Normal mitral valve. Minimal mitral  regurgitation.  Aortic Root: Normal aortic root.  Aortic Valve: Aortic valve leaflet morphology not well  visualized. Mild-moderate aortic regurgitation.  Vena  contracta width about  0.3 cm.  Left Atrium: Normal left atrium.  Left Ventricle: Normal left ventricular systolic function.  No segmental wall motion abnormalities. Normal left  ventricular internal dimensions and wall thicknesses.  Right Heart: Normal right atrium. Normal right ventricular  size and function. Normal tricuspid valve. Minimal  tricuspid regurgitation. Normal pulmonic valve.  Pericardium/PleuraNormal pericardium with no pericardial  effusion.  ------------------------------------------------------------------------  CONCLUSIONS:  1. Normal mitral valve. Minimal mitral regurgitation.  2. Aortic valve leaflet morphology not well visualized.  Mild-moderate aortic regurgitation.  Vena contracta width  about  0.3 cm.  3. Normal left ventricular internal dimensions and wall  thicknesses.  4. Normal left ventricular systolic function. No segmental  wall motion abnormalities.  5. Normal right ventricular size and function.    < end of copied text >        Urinalysis Basic - ( 15 Magen 2019 19:30 )  Color: YELLOW / Appearance: CLEAR / S.021 / pH: 6.5  Gluc: NEGATIVE / Ketone: NEGATIVE  / Bili: NEGATIVE / Urobili: NORMAL   Blood: MODERATE / Protein: 30 / Nitrite: NEGATIVE   Leuk Esterase: NEGATIVE / RBC: 11-25 / WBC 3-5   Sq Epi: OCC / Non Sq Epi: x / Bacteria: NEGATIVE      Culture - Blood (06.15.19 @ 11:18)    Culture - Blood:   NO ORGANISMS ISOLATED  NO ORGANISMS ISOLATED AT 24 HOURS    Specimen Source: BLOOD PERIPHERAL    Culture - Blood (06.15.19 @ 11:18)    Culture - Blood:   NO ORGANISMS ISOLATED  NO ORGANISMS ISOLATED AT 24 HOURS    Specimen Source: BLOOD Patient is a 44y old  Female who presents with a chief complaint of seizure, AMS (2019 00:36)      SUBJECTIVE / OVERNIGHT EVENTS: Pt agitated and refusing medical care overnight. Pt HTN ON given IV hydralazine 5mg x1 - refused BP monitoring after. Patient denies F/C, HA, CP, SOB, abdominal pain, N/V/D/C.     MEDICATIONS  (STANDING):  amLODIPine   Tablet 10 milliGRAM(s) Oral daily  carvedilol 12.5 milliGRAM(s) Oral every 12 hours  lamoTRIgine 25 milliGRAM(s) Oral at bedtime  levETIRAcetam 1000 milliGRAM(s) Oral two times a day  levoFLOXacin  Tablet 750 milliGRAM(s) Oral every 24 hours  lisinopril 5 milliGRAM(s) Oral daily  melatonin 3 milliGRAM(s) Oral at bedtime    MEDICATIONS  (PRN):  acetaminophen   Tablet .. 650 milliGRAM(s) Oral every 6 hours PRN Temp greater or equal to 38C (100.4F), Mild Pain (1 - 3), Moderate Pain (4 - 6), Severe Pain (7 - 10)  benzocaine 15 mG/menthol 3.6 mG (Sugar-Free) Lozenge 1 Lozenge Oral three times a day PRN Sore Throat  guaiFENesin   Syrup  (Sugar-Free) 100 milliGRAM(s) Oral every 6 hours PRN sore throat  LORazepam     Tablet 1 milliGRAM(s) Oral every 6 hours PRN Moderate anxiety  LORazepam     Tablet 2 milliGRAM(s) Oral every 6 hours PRN severe anxiety/agitation  LORazepam   Injectable 2 milliGRAM(s) IntraMuscular every 6 hours PRN severe anxiety/agitation  LORazepam   Injectable 2 milliGRAM(s) IntraMuscular every 6 hours PRN severe anxiety/agitation  LORazepam   Injectable 1 milliGRAM(s) IntraMuscular every 6 hours PRN Moderate anxiety  LORazepam   Injectable 1 milliGRAM(s) IV Push every 6 hours PRN moderate anxiety      T(F): 98.9 ( @ 05:24), Max: 99.2 ( @ 22:13)  HR: 65 ( @ 05:24) (62 - 83)  BP: 187/110 ( @ 05:49) (146/76 - 187/110)  RR: 16 ( @ 05:24) (16 - 18)  SpO2: 99% ( @ 05:24) (99% - 100%)    CAPILLARY BLOOD GLUCOSE        I&O's Summary    2019 07:01  -  2019 07:00  --------------------------------------------------------  IN: 1257 mL / OUT: 1400 mL / NET: -143 mL      PHYSICAL EXAM:  GEN: Appears in no acute distress  HEENT: PERRLA, EOMI and accommodate, neck supple, no lymphadenopathy, no JVD  MOUTH: moist mucous membranes, no exudates or lesions   PULM: Clear to auscultation bilaterally, no wheezes, rales, rhonchi  CV: Regular rate and rhythm, S1S2, no murmurs, rubs or gallops  ABD: Soft, nontender to palpation, non-distended, normoactive bowel sounds  EXTREMITIES: No edema, + peripheral pulses  NEURO: AAOx3, moving all four extremities spontaneously  SKIN: No rashes, lesions, hematomas, ecchymosis    LABS & IMAGING:  Labs personally reviewed [X]                          12.5   12.18 )-----------( 212      ( 2019 06:55 )             40.1       Hgb Trend: 12.4<--, 12.9<--, 15.2<--  WBC Count: 12.18 K/uL ( @ 06:55)  WBC Count: 14.32 K/uL (06-15 @ 05:20)  WBC Count: 13.00 K/uL ( @ 06:37)  WBC Count: 9.09 K/uL ( @ 18:00)      Creatinine Trend: 0.82<--, 1.09<--, 1.43<--, 1.08<--    Serum Pro-Brain Natriuretic Peptide (06.15.19 @ 05:20)    Serum Pro-Brain Natriuretic Peptide: 1889    Serum Pro-Brain Natriuretic Peptide (19 @ 19:18)    Serum Pro-Brain Natriuretic Peptide: 486.6    < from: Transthoracic Echocardiogram (19 @ 13:46) >  OBSERVATIONS:  Mitral Valve: Normal mitral valve. Minimal mitral  regurgitation.  Aortic Root: Normal aortic root.  Aortic Valve: Aortic valve leaflet morphology not well  visualized. Mild-moderate aortic regurgitation.  Vena  contracta width about  0.3 cm.  Left Atrium: Normal left atrium.  Left Ventricle: Normal left ventricular systolic function.  No segmental wall motion abnormalities. Normal left  ventricular internal dimensions and wall thicknesses.  Right Heart: Normal right atrium. Normal right ventricular  size and function. Normal tricuspid valve. Minimal  tricuspid regurgitation. Normal pulmonic valve.  Pericardium/PleuraNormal pericardium with no pericardial  effusion.  ------------------------------------------------------------------------  CONCLUSIONS:  1. Normal mitral valve. Minimal mitral regurgitation.  2. Aortic valve leaflet morphology not well visualized.  Mild-moderate aortic regurgitation.  Vena contracta width  about  0.3 cm.  3. Normal left ventricular internal dimensions and wall  thicknesses.  4. Normal left ventricular systolic function. No segmental  wall motion abnormalities.  5. Normal right ventricular size and function.    < end of copied text >        Urinalysis Basic - ( 15 Magen 2019 19:30 )  Color: YELLOW / Appearance: CLEAR / S.021 / pH: 6.5  Gluc: NEGATIVE / Ketone: NEGATIVE  / Bili: NEGATIVE / Urobili: NORMAL   Blood: MODERATE / Protein: 30 / Nitrite: NEGATIVE   Leuk Esterase: NEGATIVE / RBC: 11-25 / WBC 3-5   Sq Epi: OCC / Non Sq Epi: x / Bacteria: NEGATIVE      Culture - Blood (06.15.19 @ 11:18)    Culture - Blood:   NO ORGANISMS ISOLATED  NO ORGANISMS ISOLATED AT 24 HOURS    Specimen Source: BLOOD PERIPHERAL    Culture - Blood (06.15.19 @ 11:18)    Culture - Blood:   NO ORGANISMS ISOLATED  NO ORGANISMS ISOLATED AT 24 HOURS    Specimen Source: BLOOD Patient is a 44y old  Female who presents with a chief complaint of seizure, AMS (2019 00:36)      SUBJECTIVE / OVERNIGHT EVENTS: Pt agitated and refusing medical care overnight. Pt HTN ON given IV hydralazine 5mg x1 - refused BP monitoring after. Patient denies F/C, HA, CP, SOB, abdominal pain, N/V/D/C.     MEDICATIONS  (STANDING):  amLODIPine   Tablet 10 milliGRAM(s) Oral daily  carvedilol 12.5 milliGRAM(s) Oral every 12 hours  lamoTRIgine 25 milliGRAM(s) Oral at bedtime  levETIRAcetam 1000 milliGRAM(s) Oral two times a day  levoFLOXacin  Tablet 750 milliGRAM(s) Oral every 24 hours  lisinopril 5 milliGRAM(s) Oral daily  melatonin 3 milliGRAM(s) Oral at bedtime    MEDICATIONS  (PRN):  acetaminophen   Tablet .. 650 milliGRAM(s) Oral every 6 hours PRN Temp greater or equal to 38C (100.4F), Mild Pain (1 - 3), Moderate Pain (4 - 6), Severe Pain (7 - 10)  benzocaine 15 mG/menthol 3.6 mG (Sugar-Free) Lozenge 1 Lozenge Oral three times a day PRN Sore Throat  guaiFENesin   Syrup  (Sugar-Free) 100 milliGRAM(s) Oral every 6 hours PRN sore throat  LORazepam     Tablet 1 milliGRAM(s) Oral every 6 hours PRN Moderate anxiety  LORazepam     Tablet 2 milliGRAM(s) Oral every 6 hours PRN severe anxiety/agitation  LORazepam   Injectable 2 milliGRAM(s) IntraMuscular every 6 hours PRN severe anxiety/agitation  LORazepam   Injectable 2 milliGRAM(s) IntraMuscular every 6 hours PRN severe anxiety/agitation  LORazepam   Injectable 1 milliGRAM(s) IntraMuscular every 6 hours PRN Moderate anxiety  LORazepam   Injectable 1 milliGRAM(s) IV Push every 6 hours PRN moderate anxiety      T(F): 98.9 ( @ 05:24), Max: 99.2 ( @ 22:13)  HR: 65 ( @ 05:24) (62 - 83)  BP: 187/110 ( @ 05:49) (146/76 - 187/110)  RR: 16 ( @ 05:24) (16 - 18)  SpO2: 99% ( @ 05:24) (99% - 100%)    CAPILLARY BLOOD GLUCOSE        I&O's Summary    2019 07:01  -  2019 07:00  --------------------------------------------------------  IN: 1257 mL / OUT: 1400 mL / NET: -143 mL      PHYSICAL EXAM:  GEN: Appears in no acute distress  HEENT: PERRLA, EOMI and accommodate, neck supple, no lymphadenopathy, no JVD  MOUTH: moist mucous membranes, no exudates or lesions   PULM: Clear to auscultation bilaterally, no wheezes, rales, rhonchi  CV: Regular rate and rhythm, S1S2, no murmurs, rubs or gallops  ABD: Soft, nontender to palpation, non-distended, normoactive bowel sounds  EXTREMITIES: No edema, + peripheral pulses  NEURO: AAOx3, moving all four extremities spontaneously  SKIN: No rashes, lesions, hematomas, ecchymosis    LABS & IMAGING:  Labs personally reviewed [X]                          12.5   12.18 )-----------( 212      ( 2019 06:55 )             40.1       Hgb Trend: 12.4<--, 12.9<--, 15.2<--  WBC Count: 12.18 K/uL ( @ 06:55)  WBC Count: 14.32 K/uL (06-15 @ 05:20)  WBC Count: 13.00 K/uL ( @ 06:37)  WBC Count: 9.09 K/uL ( @ 18:00)      Creatinine Trend: 0.82<--, 1.09<--, 1.43<--, 1.08<--    Serum Pro-Brain Natriuretic Peptide (06.15.19 @ 05:20)    Serum Pro-Brain Natriuretic Peptide: 1889    Serum Pro-Brain Natriuretic Peptide (19 @ 19:18)    Serum Pro-Brain Natriuretic Peptide: 486.6    < from: Transthoracic Echocardiogram (19 @ 13:46) >  OBSERVATIONS:  Mitral Valve: Normal mitral valve. Minimal mitral  regurgitation.  Aortic Root: Normal aortic root.  Aortic Valve: Aortic valve leaflet morphology not well  visualized. Mild-moderate aortic regurgitation.  Vena  contracta width about  0.3 cm.  Left Atrium: Normal left atrium.  Left Ventricle: Normal left ventricular systolic function.  No segmental wall motion abnormalities. Normal left  ventricular internal dimensions and wall thicknesses.  Right Heart: Normal right atrium. Normal right ventricular  size and function. Normal tricuspid valve. Minimal  tricuspid regurgitation. Normal pulmonic valve.  Pericardium/PleuraNormal pericardium with no pericardial  effusion.  ------------------------------------------------------------------------  CONCLUSIONS:  1. Normal mitral valve. Minimal mitral regurgitation.  2. Aortic valve leaflet morphology not well visualized.  Mild-moderate aortic regurgitation.  Vena contracta width  about  0.3 cm.  3. Normal left ventricular internal dimensions and wall  thicknesses.  4. Normal left ventricular systolic function. No segmental  wall motion abnormalities.  5. Normal right ventricular size and function.    < end of copied text >        Urinalysis Basic - ( 15 Magen 2019 19:30 )  Color: YELLOW / Appearance: CLEAR / S.021 / pH: 6.5  Gluc: NEGATIVE / Ketone: NEGATIVE  / Bili: NEGATIVE / Urobili: NORMAL   Blood: MODERATE / Protein: 30 / Nitrite: NEGATIVE   Leuk Esterase: NEGATIVE / RBC: 11-25 / WBC 3-5   Sq Epi: OCC / Non Sq Epi: x / Bacteria: NEGATIVE      Culture - Blood (06.15.19 @ 11:18)    Culture - Blood:   NO ORGANISMS ISOLATED  NO ORGANISMS ISOLATED AT 24 HOURS    Specimen Source: BLOOD PERIPHERAL    Culture - Blood (06.15.19 @ 11:18)    Culture - Blood:   NO ORGANISMS ISOLATED  NO ORGANISMS ISOLATED AT 24 HOURS    Specimen Source: BLOOD    < from: MR Head w/wo IV Cont (19 @ 12:23) >    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    Abnormal susceptibility seen involving the right suprasellar region as   described above.    < end of copied text >    CASE DISCUSSED WITH: Dr. Ley (psych) re: dc planning

## 2019-06-17 NOTE — DISCHARGE NOTE PROVIDER - NSFOLLOWUPCLINICS_GEN_ALL_ED_FT
[facility]  [facility]  [address] [place] [address]  Phone: [phone]  Fax: [phone]  Follow Up Time: 4-6 Days

## 2019-06-17 NOTE — CHART NOTE - NSCHARTNOTEFT_GEN_A_CORE
NUTRITION EDUCATION:    Current Weight: 241.6lbs (109.6kg)  Height: 68"  BMI=36.7 kg/m2 (consistent w class II obesity)      Notified by RN Pt. is requesting diet instruction as it relates to Hx of HTN & obesity.  Pt. states her blood pressure is consistently elevated, despite adhering to medication regimen PTA, as well as self monitoring BP.    Most meals consumed are prepared at home and Pt. states she does not add salt & uses Mrs. Arellano seasoning.  She occasionally eats food outside home.  Does like to eat cunningham and soup.  Advised on limiting sodium dense foods.    Drinks primarily water and apple juice as main beverages.  Suggest decreasing intake of juice, given it's concentrated sugar content & advised on methods to flavor water.    Also educated on lower fat cooking methods & lean protein choices.    Informed of risks associated w obesity & encouraged physical activity (as/if deemed medically safe).        Pt. inquires about outpatient nutrition counseling.   RDN to provide contact information for nutrition/weight counseling programs.      Printed education material provided and all nutrition related questions answered and discussed with Pt      Advised RDN remains available.    Will f/u PRN.      Cony Gustafson RDN, CDN  pager [phone]

## 2019-06-17 NOTE — PROGRESS NOTE ADULT - PROBLEM SELECTOR PLAN 5
Hypertensive urgency  - c/w carvedilol 6.25 BID, lisinopril 5mg daily and amlodipine 5mg daily  - pt hypertensive because she has been refusing meds  - requiring PNR Iv push of hydralazine for HTN urgency

## 2019-06-17 NOTE — PROGRESS NOTE ADULT - PROBLEM SELECTOR PLAN 7
DVT ppx: improve score low  PT: home w/ no PT  Pt should not be driving in the setting of seizures and will need coordination with SW to notify DMV - license suspension for 1 year seizure free period

## 2019-06-18 ENCOUNTER — TRANSCRIPTION ENCOUNTER (OUTPATIENT)
Age: 44
End: 2019-06-18

## 2019-06-18 VITALS
DIASTOLIC BLOOD PRESSURE: 97 MMHG | RESPIRATION RATE: 18 BRPM | OXYGEN SATURATION: 100 % | TEMPERATURE: 99 F | SYSTOLIC BLOOD PRESSURE: 146 MMHG | HEART RATE: 65 BPM

## 2019-06-18 LAB
ALBUMIN SERPL ELPH-MCNC: 3.7 G/DL — SIGNIFICANT CHANGE UP (ref 3.3–5)
ALP SERPL-CCNC: 52 U/L — SIGNIFICANT CHANGE UP (ref 40–120)
ALT FLD-CCNC: 12 U/L — SIGNIFICANT CHANGE UP (ref 4–33)
ANION GAP SERPL CALC-SCNC: 13 MMO/L — SIGNIFICANT CHANGE UP (ref 7–14)
AST SERPL-CCNC: 15 U/L — SIGNIFICANT CHANGE UP (ref 4–32)
BILIRUB SERPL-MCNC: 0.3 MG/DL — SIGNIFICANT CHANGE UP (ref 0.2–1.2)
BUN SERPL-MCNC: 12 MG/DL — SIGNIFICANT CHANGE UP (ref 7–23)
CALCIUM SERPL-MCNC: 9.8 MG/DL — SIGNIFICANT CHANGE UP (ref 8.4–10.5)
CHLORIDE SERPL-SCNC: 101 MMOL/L — SIGNIFICANT CHANGE UP (ref 98–107)
CO2 SERPL-SCNC: 21 MMOL/L — LOW (ref 22–31)
CREAT SERPL-MCNC: 0.91 MG/DL — SIGNIFICANT CHANGE UP (ref 0.5–1.3)
GLUCOSE SERPL-MCNC: 78 MG/DL — SIGNIFICANT CHANGE UP (ref 70–99)
HCT VFR BLD CALC: 39.3 % — SIGNIFICANT CHANGE UP (ref 34.5–45)
HGB BLD-MCNC: 12.1 G/DL — SIGNIFICANT CHANGE UP (ref 11.5–15.5)
MAGNESIUM SERPL-MCNC: 1.8 MG/DL — SIGNIFICANT CHANGE UP (ref 1.6–2.6)
MCHC RBC-ENTMCNC: 24.9 PG — LOW (ref 27–34)
MCHC RBC-ENTMCNC: 30.8 % — LOW (ref 32–36)
MCV RBC AUTO: 81 FL — SIGNIFICANT CHANGE UP (ref 80–100)
NRBC # FLD: 0.02 K/UL — SIGNIFICANT CHANGE UP (ref 0–0)
PHOSPHATE SERPL-MCNC: 3.3 MG/DL — SIGNIFICANT CHANGE UP (ref 2.5–4.5)
PLATELET # BLD AUTO: 215 K/UL — SIGNIFICANT CHANGE UP (ref 150–400)
PMV BLD: 13.1 FL — HIGH (ref 7–13)
POTASSIUM SERPL-MCNC: 4.1 MMOL/L — SIGNIFICANT CHANGE UP (ref 3.5–5.3)
POTASSIUM SERPL-SCNC: 4.1 MMOL/L — SIGNIFICANT CHANGE UP (ref 3.5–5.3)
PROT SERPL-MCNC: 6.9 G/DL — SIGNIFICANT CHANGE UP (ref 6–8.3)
RBC # BLD: 4.85 M/UL — SIGNIFICANT CHANGE UP (ref 3.8–5.2)
RBC # FLD: 17.3 % — HIGH (ref 10.3–14.5)
SODIUM SERPL-SCNC: 135 MMOL/L — SIGNIFICANT CHANGE UP (ref 135–145)
WBC # BLD: 11.18 K/UL — HIGH (ref 3.8–10.5)
WBC # FLD AUTO: 11.18 K/UL — HIGH (ref 3.8–10.5)

## 2019-06-18 PROCEDURE — 70544 MR ANGIOGRAPHY HEAD W/O DYE: CPT | Mod: 26

## 2019-06-18 PROCEDURE — 99239 HOSP IP/OBS DSCHRG MGMT >30: CPT | Mod: GC

## 2019-06-18 PROCEDURE — 99233 SBSQ HOSP IP/OBS HIGH 50: CPT

## 2019-06-18 RX ORDER — CIPROFLOXACIN LACTATE 400MG/40ML
1 VIAL (ML) INTRAVENOUS
Qty: 2 | Refills: 0
Start: 2019-06-18 | End: 2019-06-19

## 2019-06-18 RX ORDER — LEVETIRACETAM 250 MG/1
1 TABLET, FILM COATED ORAL
Qty: 60 | Refills: 2
Start: 2019-06-18 | End: 2019-09-15

## 2019-06-18 RX ORDER — LEVETIRACETAM 250 MG/1
1 TABLET, FILM COATED ORAL
Qty: 0 | Refills: 0 | DISCHARGE

## 2019-06-18 RX ORDER — AMLODIPINE BESYLATE 2.5 MG/1
1 TABLET ORAL
Qty: 30 | Refills: 2
Start: 2019-06-18 | End: 2019-09-15

## 2019-06-18 RX ORDER — CARVEDILOL PHOSPHATE 80 MG/1
1 CAPSULE, EXTENDED RELEASE ORAL
Qty: 0 | Refills: 0 | DISCHARGE

## 2019-06-18 RX ORDER — LAMOTRIGINE 25 MG/1
1 TABLET, ORALLY DISINTEGRATING ORAL
Qty: 30 | Refills: 2
Start: 2019-06-18 | End: 2019-09-15

## 2019-06-18 RX ORDER — AMLODIPINE BESYLATE 2.5 MG/1
1 TABLET ORAL
Qty: 0 | Refills: 0 | DISCHARGE

## 2019-06-18 RX ORDER — CARVEDILOL PHOSPHATE 80 MG/1
1 CAPSULE, EXTENDED RELEASE ORAL
Qty: 60 | Refills: 2
Start: 2019-06-18 | End: 2019-09-15

## 2019-06-18 RX ADMIN — CARVEDILOL PHOSPHATE 12.5 MILLIGRAM(S): 80 CAPSULE, EXTENDED RELEASE ORAL at 05:51

## 2019-06-18 RX ADMIN — AMLODIPINE BESYLATE 10 MILLIGRAM(S): 2.5 TABLET ORAL at 05:51

## 2019-06-18 RX ADMIN — LISINOPRIL 5 MILLIGRAM(S): 2.5 TABLET ORAL at 05:51

## 2019-06-18 RX ADMIN — LEVETIRACETAM 1000 MILLIGRAM(S): 250 TABLET, FILM COATED ORAL at 08:41

## 2019-06-18 NOTE — PROVIDER CONTACT NOTE (OTHER) - REASON
/106 HR 90
/107
/109 Pt refusing AM BP meds
/114
/92 when rechecked from earlier this morning
/92, all other VS stable.
/95 before coreg administration
BP is 151/100
BP is 187/110
High blood pressure
Patient refused vital signs, assessment, medications; patient had outburst of agitation throughout the day.
Patient refusing medications
pt refusing blood pressure reassessment after Hydralazine IV push

## 2019-06-18 NOTE — DISCHARGE NOTE NURSING/CASE MANAGEMENT/SOCIAL WORK - NSDCDPATPORTLINK_GEN_ALL_CORE
You can access the [facility] Portal, offered by [facility], by registering with the following website: [email or web address]

## 2019-06-18 NOTE — PROVIDER CONTACT NOTE (OTHER) - RECOMMENDATIONS
Continue to monitor and educate the pt
Continue to monitor and educate patient.
Continue to monitor patient
Continue to monitor patient.
Continue to monitor.
Give pt daily BP medications
Give pt rest of at home BP medications
Have provider come and speak with the patient
MD made aware. MD to assess patient. Recommending psych consult to evaluate patient.
MD notified
Patient will receive her Bp medications this morning
order BP meds, antipyretics??
take morning BP meds along with iv push hydralazine

## 2019-06-18 NOTE — PROGRESS NOTE BEHAVIORAL HEALTH - ORIENTATION OTHER
for year first says "1975" (which is her birth year), then says 2019
for year first says "1975" (which is her birth year), then says 2019
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
## 2019-06-18 NOTE — PROGRESS NOTE ADULT - PROBLEM SELECTOR PLAN 3
Pt with leukocytosis and tachypnea meeting SIRS criteria - now stable.  - Pt spiked a fever 6/15 to 100.4, CXR w/ clear lungs, negative RVP, negative UA, leukocytosis may be stress response vs VAP  - f/u BCX 6/15 - NGTD  - repeat CXR without PNA  - Levaquin 750mg QD for 5 days - 6/16 to 6/20

## 2019-06-18 NOTE — PROGRESS NOTE BEHAVIORAL HEALTH - NSBHCHARTREVIEWINVESTIGATE_PSY_A_CORE FT
< from: 12 Lead ECG (06.12.19 @ 19:03) >    Ventricular Rate 103 BPM    Atrial Rate 103 BPM    P-R Interval 160 ms    QRS Duration 94 ms    Q-T Interval 330 ms    QTC Calculation(Bezet) 432 ms    P Axis 56 degrees    R Axis 8 degrees    T Axis 105 degrees    Diagnosis Line Sinus tachycardia  Biatrial enlargement  Septal infarct , age undetermined  T wave abnormality, consider lateral ischemia  Abnormal ECG
< from: 12 Lead ECG (06.12.19 @ 19:03) >    Ventricular Rate 103 BPM  Atrial Rate 103 BPM  P-R Interval 160 ms  QRS Duration 94 ms  Q-T Interval 330 ms  QTC Calculation(Bezet) 432 ms  P Axis 56 degrees  R Axis 8 degrees  T Axis 105 degrees  Diagnosis Line Sinus tachycardia  Biatrial enlargement  Septal infarct , age undetermined  T wave abnormality, consider lateral ischemia  Abnormal ECG

## 2019-06-18 NOTE — PROGRESS NOTE ADULT - PROVIDER SPECIALTY LIST ADULT
Internal Medicine
MICU
Neurology
Internal Medicine

## 2019-06-18 NOTE — PROGRESS NOTE BEHAVIORAL HEALTH - NSBHADMITCOUNSEL_PSY_A_CORE
risk factor reduction/client/family/caregiver education/importance of adherence to chosen treatment
importance of adherence to chosen treatment/risk factor reduction/client/family/caregiver education/prognosis

## 2019-06-18 NOTE — PROGRESS NOTE BEHAVIORAL HEALTH - NSBHFUPINTERVALHXFT_PSY_A_CORE
Patient seen for follow-up. Alert and fully oriented, denying SI/ HI and hallucinations. She's more subdued and not interested in talking, with mostly one word answers and sometimes turns her attention back to the TV. She reports that she didn't sleep well because she's "just not tired, I guess." Her appetite is good. She says she's been told that due to her seizures she has to stop driving, which will result in her losing her job as a . She doesn't like this, but says "of course!" she'll abide by it. She doesn't know what she'll do instead for work, but will talk to her boss about it. She says she'd like to go home.
Patient seen for follow-up. Alert and fully oriented, denying SI/ HI and hallucinations. Patient with odd affect, incongruent to speech, denying being angry or having any problem with her treatment. Patient states she believes that she needs to change her diet because she'd initially lost weight and her blood pressure was fairly stable but now it is erratic despite her insistence that she has been taking her BP medications as prescribed. Patient states that she "likes things her way" and "will just walk away" if she feels someone is challenging her.

## 2019-06-18 NOTE — PROGRESS NOTE ADULT - PROBLEM SELECTOR PLAN 2
Pt seen by psych, who convey that pt may have paranoid personality disorder in addition to possible delirium and lacks capacity to leave AMA or refuse her medications.
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
## 2019-06-18 NOTE — PROVIDER CONTACT NOTE (OTHER) - NAME OF MD/NP/PA/DO NOTIFIED:
Dr. Kishor Cornejo
Dr. Kishor Cornejo
Dr. Von Jaime
Finn Sierra MD
Finn Sierra MD
MD Briseno
MD Hitchcock
MD Jackson
MD Jackson
MD Roper
Vipul Trevizo MD
Vipul Trevizo MD
MD Briseno

## 2019-06-18 NOTE — PROVIDER CONTACT NOTE (OTHER) - BACKGROUND
Pt admitted with nonintractable epilepsy without status epilepticus
Patient admitted for epilepsy with past medical history of hypertension, thrombus, and asthma.
Patient admitted for epilepsy without status epilepticus with PMH of thrombus and asthma
Patient admitted for epilepsy without status epilepticus with pmh of thrombus and asthma.
Patient admitted for nonintractable epilepsy without status epilepticus. PMH includes thrombus, seizure, NSTEMI, asthma, HTN.
Patient came in with epilepsy,  history of seizure disorder, Nstemi, asthma, hypertension.
Patient had a seizure, history of MI, thrombus, asthma, hypertension
Pt admitted with nonintractable epilepsy without status epilepticus. PMH Thrombus, Seizure disorder, Asthma, NSTEMI, HTN
Pt admitted with nonintractable epilepsy without status epilipticus. PMH Thrombus, Seizure disorder, Asthma, NSTEMI
admitted with nonintractable epilepsy without status epilepticus
admitted with nonintractable epilepsy without status epilepticus
s/p seizures transferred from MICU
s/p seizures transferred from MICU

## 2019-06-18 NOTE — PROGRESS NOTE ADULT - PROBLEM SELECTOR PLAN 5
Hypertensive urgency, BP stable ON with elevated diastolic BP  - c/w carvedilol 6.25 BID, lisinopril 5mg daily and amlodipine 5mg daily  - pt hypertensive because she has been refusing meds in combination with agitation  - IV hydralazine PRN for acute HTN

## 2019-06-18 NOTE — PROGRESS NOTE BEHAVIORAL HEALTH - NSBHCHARTREVIEWVS_PSY_A_CORE FT
Vital Signs Last 24 Hrs  T(C): 37.2 (17 Jun 2019 05:24), Max: 37.3 (16 Jun 2019 22:13)  T(F): 98.9 (17 Jun 2019 05:24), Max: 99.2 (16 Jun 2019 22:13)  HR: 64 (17 Jun 2019 11:07) (62 - 83)  BP: 158/92 (17 Jun 2019 11:07) (146/76 - 187/110)  BP(mean): --  RR: 16 (17 Jun 2019 11:07) (16 - 18)  SpO2: 100% (17 Jun 2019 11:07) (99% - 100%)
Vital Signs Last 24 Hrs  T(C): 36.6 (18 Jun 2019 05:45), Max: 37.3 (17 Jun 2019 22:18)  T(F): 97.8 (18 Jun 2019 05:45), Max: 99.1 (17 Jun 2019 22:18)  HR: 65 (18 Jun 2019 05:45) (65 - 74)  BP: 151/100 (18 Jun 2019 05:45) (148/87 - 151/100)  BP(mean): --  RR: 17 (18 Jun 2019 05:45) (17 - 17)  SpO2: 100% (18 Jun 2019 05:45) (99% - 100%)

## 2019-06-18 NOTE — DISCHARGE NOTE NURSING/CASE MANAGEMENT/SOCIAL WORK - NSDCFUADDAPPT_GEN_ALL_CORE_FT
Please call the [facility] clinic to schedule a follow up or follow up with your previous neurologist, Dr. Mattson.    You are scheduled for an appointment with Dr. Smart next Tuesday at noon. PLease see her for further follow up.

## 2019-06-18 NOTE — PROGRESS NOTE BEHAVIORAL HEALTH - NSBHCHARTREVIEWLAB_PSY_A_CORE FT
12.5   12.18 )-----------( 212      ( 2019 06:55 )             40.1                       Urinalysis Basic - ( 15 Magen 2019 19:30 )    Color: YELLOW / Appearance: CLEAR / S.021 / pH: 6.5  Gluc: NEGATIVE / Ketone: NEGATIVE  / Bili: NEGATIVE / Urobili: NORMAL   Blood: MODERATE / Protein: 30 / Nitrite: NEGATIVE   Leuk Esterase: NEGATIVE / RBC: 11-25 / WBC 3-5   Sq Epi: OCC / Non Sq Epi: x / Bacteria: NEGATIVE      Lactate Trend   @ 20:55 Lactate:2.5    @ 19:08 Lactate:7.3
CBC Full  -  ( 18 Jun 2019 06:10 )  WBC Count : 11.18 K/uL  RBC Count : 4.85 M/uL  Hemoglobin : 12.1 g/dL  Hematocrit : 39.3 %  Platelet Count - Automated : 215 K/uL  Mean Cell Volume : 81.0 fL  Mean Cell Hemoglobin : 24.9 pg  Mean Cell Hemoglobin Concentration : 30.8 %  Auto Neutrophil # : x  Auto Lymphocyte # : x  Auto Monocyte # : x  Auto Eosinophil # : x  Auto Basophil # : x  Auto Neutrophil % : x  Auto Lymphocyte % : x  Auto Monocyte % : x  Auto Eosinophil % : x  Auto Basophil % : x    06-18    135  |  101  |  12  ----------------------------<  78  4.1   |  21<L>  |  0.91    Ca    9.8      18 Jun 2019 06:10  Phos  3.3     06-18  Mg     1.8     06-18    TPro  6.9  /  Alb  3.7  /  TBili  0.3  /  DBili  x   /  AST  15  /  ALT  12  /  AlkPhos  52  06-18    LIVER FUNCTIONS - ( 18 Jun 2019 06:10 )  Alb: 3.7 g/dL / Pro: 6.9 g/dL / ALK PHOS: 52 u/L / ALT: 12 u/L / AST: 15 u/L / GGT: x

## 2019-06-18 NOTE — PROGRESS NOTE BEHAVIORAL HEALTH - RISK ASSESSMENT
incr chronic risk of harm to self/ others 2/2 underlying personality disorder, seizure disorder    risk should be mitigated by revocation of patient's 's license
incr chronic risk of harm to self/ others 2/2 underlying personality disorder, seizure disorder    risk should be mitigated by revocation of patient's 's license

## 2019-06-18 NOTE — PROVIDER CONTACT NOTE (OTHER) - DATE AND TIME:
14-Jun-2019 08:43
14-Jun-2019 17:04
14-Jun-2019 18:35
14-Jun-2019 22:00
15-Magen-2019 06:30
15-Magen-2019 21:50
16-Jun-2019 05:10
16-Jun-2019 15:55
16-Jun-2019 18:39
17-Jun-2019 05:50
17-Jun-2019 11:09
18-Jun-2019 06:00
17-Jun-2019 06:48

## 2019-06-18 NOTE — SBIRT NOTE ADULT - NSSBIRTDRGBRIEFINTDET_GEN_A_CORE
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
## 2019-06-18 NOTE — PROVIDER CONTACT NOTE (OTHER) - SITUATION
pt refusing blood pressure reassessment after Hydralazine IV push
/106 HR 90 Temp 100.4
/107
/109 Pt refusing AM BP meds
/114
/92 when rechecked from earlier this morning
/92, all other VS stable.
/95 before coreg administration
BP is 151/100
BP is 187/110
Patient is refusing medication this evening
Patient refused vital signs, assessment, medications; patient had outburst of agitation throughout the day. Patient transferred from MICU to 9N at 0800. Report was taken from MICU by night RN.
Patient with blood pressure of 161/102

## 2019-06-18 NOTE — PROGRESS NOTE ADULT - SUBJECTIVE AND OBJECTIVE BOX
Patient is a 44y old  Female who presents with a chief complaint of seizure, AMS (17 Jun 2019 07:51)      SUBJECTIVE / OVERNIGHT EVENTS: No acute events overnight. Patient denies F/C, HA, CP, SOB, abdominal pain, N/V/D/C.    MEDICATIONS  (STANDING):  amLODIPine   Tablet 10 milliGRAM(s) Oral daily  carvedilol 12.5 milliGRAM(s) Oral every 12 hours  lamoTRIgine 25 milliGRAM(s) Oral at bedtime  levETIRAcetam 1000 milliGRAM(s) Oral two times a day  levoFLOXacin  Tablet 750 milliGRAM(s) Oral every 24 hours  lisinopril 5 milliGRAM(s) Oral daily  melatonin 3 milliGRAM(s) Oral at bedtime    MEDICATIONS  (PRN):  acetaminophen   Tablet .. 650 milliGRAM(s) Oral every 6 hours PRN Temp greater or equal to 38C (100.4F), Mild Pain (1 - 3), Moderate Pain (4 - 6), Severe Pain (7 - 10)  benzocaine 15 mG/menthol 3.6 mG (Sugar-Free) Lozenge 1 Lozenge Oral three times a day PRN Sore Throat  guaiFENesin   Syrup  (Sugar-Free) 100 milliGRAM(s) Oral every 6 hours PRN sore throat  LORazepam     Tablet 1 milliGRAM(s) Oral every 6 hours PRN Moderate anxiety  LORazepam     Tablet 2 milliGRAM(s) Oral every 6 hours PRN severe anxiety/agitation  LORazepam   Injectable 2 milliGRAM(s) IntraMuscular every 6 hours PRN severe anxiety/agitation  LORazepam   Injectable 2 milliGRAM(s) IntraMuscular every 6 hours PRN severe anxiety/agitation  LORazepam   Injectable 1 milliGRAM(s) IntraMuscular every 6 hours PRN Moderate anxiety  LORazepam   Injectable 1 milliGRAM(s) IV Push every 6 hours PRN moderate anxiety      T(F): 97.8 (06-18 @ 05:45), Max: 99.1 (06-17 @ 22:18)  HR: 65 (06-18 @ 05:45) (64 - 74)  BP: 151/100 (06-18 @ 05:45) (148/87 - 158/92)  RR: 17 (06-18 @ 05:45) (16 - 17)  SpO2: 100% (06-18 @ 05:45) (99% - 100%)    CAPILLARY BLOOD GLUCOSE        I&O's Summary    17 Jun 2019 07:01  -  18 Jun 2019 07:00  --------------------------------------------------------  IN: 737 mL / OUT: 1250 mL / NET: -513 mL      PHYSICAL EXAM:  GEN: Appears in no acute distress  HEENT: neck supple, no lymphadenopathy, no JVD  PULM: Clear to auscultation bilaterally, no wheezes, rales, rhonchi  CV: Regular rate and rhythm, S1S2, no murmurs, rubs or gallops  ABD: Soft, nontender to palpation, non-distended, normoactive bowel sounds  EXTREMITIES: No edema, + peripheral pulses  NEURO: AAOx3, moving all four extremities spontaneously  SKIN: No rashes, lesions, hematomas, ecchymosis    LABS & IMAGING:  Labs personally reviewed [X]                        12.1   11.18 )-----------( 215      ( 18 Jun 2019 06:10 )             39.3     Hgb Trend: 12.1<--, 12.5<--, 12.4<--, 12.9<--, 15.2<--    06-18    135  |  101  |  12  ----------------------------<  78  4.1   |  21<L>  |  0.91    Ca    9.8      18 Jun 2019 06:10  Phos  3.3     06-18  Mg     1.8     06-18    TPro  6.9  /  Alb  3.7  /  TBili  0.3  /  DBili  x   /  AST  15  /  ALT  12  /  AlkPhos  52  06-18    Creatinine Trend: 0.91<--, 0.82<--, 1.09<--, 1.43<--, 1.08<--    Culture - Blood (06.15.19 @ 11:18)    Culture - Blood:   NO ORGANISMS ISOLATED  NO ORGANISMS ISOLATED AT 48 HRS.    Specimen Source: BLOOD PERIPHERAL    Culture - Blood (06.15.19 @ 11:18)    Culture - Blood:   NO ORGANISMS ISOLATED  NO ORGANISMS ISOLATED AT 48 HRS.    Specimen Source: BLOOD        < from: MR Head w/wo IV Cont (06.16.19 @ 12:23) >  INTERPRETATION:  Clinical indication: Seizures.    MRI of the brain was performed using standard [facility] TLE   protocol. The patient was injected with approximately 10 cc ofGadavist   IV with no IV contrast discarded. Sagittal coronal and axial T1-weighted   sequences were performed.    This exam is compared with prior noncontrast head CT performed on June 12, 2019.    Area of encephalomalacia and gliosis is seen involving left basal ganglia   region. Ex vacuo dilatation of the anterior body left lateral ventricle   is again seen.    Abnormal T2 prolongation is seen involving the left posterior   frontal/parietal cortex. This likely compatible with an area of gliosis   secondary to an old infarct.    There is no evidence of acute hemorrhage in posterior fossa supratentorial    No mass mass effect seen.    Abnormal susceptibility seen involving the right parasellar region.   Please note the possibility of old hemorrhage, mineralization cavernoma   or underlying aneurysm cannot be entirely excluded. MRA of the [place]   Garcia can be done to better evaluate for underlying aneurysm.     Evaluation of the diffusion weighted sequence demonstrates no abnormal   areas of restricted diffusion to suggest acute infarct.    The visualized paranasal sinuses mastoid and middle ear appear clear.    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    Abnormal susceptibility seen involving the right suprasellar region as   described above.    < end of copied text > Patient is a 44y old  Female who presents with a chief complaint of seizure, AMS (17 Jun 2019 07:51)      SUBJECTIVE / OVERNIGHT EVENTS: No acute events overnight. Patient denies F/C, HA, CP, SOB, abdominal pain, N/V/D/C.    MEDICATIONS  (STANDING):  amLODIPine   Tablet 10 milliGRAM(s) Oral daily  carvedilol 12.5 milliGRAM(s) Oral every 12 hours  lamoTRIgine 25 milliGRAM(s) Oral at bedtime  levETIRAcetam 1000 milliGRAM(s) Oral two times a day  levoFLOXacin  Tablet 750 milliGRAM(s) Oral every 24 hours  lisinopril 5 milliGRAM(s) Oral daily  melatonin 3 milliGRAM(s) Oral at bedtime    MEDICATIONS  (PRN):  acetaminophen   Tablet .. 650 milliGRAM(s) Oral every 6 hours PRN Temp greater or equal to 38C (100.4F), Mild Pain (1 - 3), Moderate Pain (4 - 6), Severe Pain (7 - 10)  benzocaine 15 mG/menthol 3.6 mG (Sugar-Free) Lozenge 1 Lozenge Oral three times a day PRN Sore Throat  guaiFENesin   Syrup  (Sugar-Free) 100 milliGRAM(s) Oral every 6 hours PRN sore throat  LORazepam     Tablet 1 milliGRAM(s) Oral every 6 hours PRN Moderate anxiety  LORazepam     Tablet 2 milliGRAM(s) Oral every 6 hours PRN severe anxiety/agitation  LORazepam   Injectable 2 milliGRAM(s) IntraMuscular every 6 hours PRN severe anxiety/agitation  LORazepam   Injectable 2 milliGRAM(s) IntraMuscular every 6 hours PRN severe anxiety/agitation  LORazepam   Injectable 1 milliGRAM(s) IntraMuscular every 6 hours PRN Moderate anxiety  LORazepam   Injectable 1 milliGRAM(s) IV Push every 6 hours PRN moderate anxiety      T(F): 97.8 (06-18 @ 05:45), Max: 99.1 (06-17 @ 22:18)  HR: 65 (06-18 @ 05:45) (64 - 74)  BP: 151/100 (06-18 @ 05:45) (148/87 - 158/92)  RR: 17 (06-18 @ 05:45) (16 - 17)  SpO2: 100% (06-18 @ 05:45) (99% - 100%)    CAPILLARY BLOOD GLUCOSE        I&O's Summary    17 Jun 2019 07:01  -  18 Jun 2019 07:00  --------------------------------------------------------  IN: 737 mL / OUT: 1250 mL / NET: -513 mL      PHYSICAL EXAM:  GEN: Appears in no acute distress  HEENT: neck supple, no lymphadenopathy, no JVD  PULM: Clear to auscultation bilaterally, no wheezes, rales, rhonchi  CV: Regular rate and rhythm, S1S2, no murmurs, rubs or gallops  ABD: Soft, nontender to palpation, non-distended, normoactive bowel sounds  EXTREMITIES: No edema, + peripheral pulses  NEURO: AAOx3, moving all four extremities spontaneously  SKIN: No rashes, lesions, hematomas, ecchymosis    LABS & IMAGING:  Labs personally reviewed [X]                        12.1   11.18 )-----------( 215      ( 18 Jun 2019 06:10 )             39.3     Hgb Trend: 12.1<--, 12.5<--, 12.4<--, 12.9<--, 15.2<--  WBC Count: 11.18 K/uL (06-18 @ 06:10)  WBC Count: 12.18 K/uL (06-17 @ 06:55)  WBC Count: 14.32 K/uL (06-15 @ 05:20)      06-18    135  |  101  |  12  ----------------------------<  78  4.1   |  21<L>  |  0.91    Ca    9.8      18 Jun 2019 06:10  Phos  3.3     06-18  Mg     1.8     06-18    TPro  6.9  /  Alb  3.7  /  TBili  0.3  /  DBili  x   /  AST  15  /  ALT  12  /  AlkPhos  52  06-18    Creatinine Trend: 0.91<--, 0.82<--, 1.09<--, 1.43<--, 1.08<--    Culture - Blood (06.15.19 @ 11:18)    Culture - Blood:   NO ORGANISMS ISOLATED  NO ORGANISMS ISOLATED AT 48 HRS.    Specimen Source: BLOOD PERIPHERAL    Culture - Blood (06.15.19 @ 11:18)    Culture - Blood:   NO ORGANISMS ISOLATED  NO ORGANISMS ISOLATED AT 48 HRS.    Specimen Source: BLOOD        < from: MR Head w/wo IV Cont (06.16.19 @ 12:23) >  INTERPRETATION:  Clinical indication: Seizures.    MRI of the brain was performed using standard [facility] TLE   protocol. The patient was injected with approximately 10 cc ofGadavist   IV with no IV contrast discarded. Sagittal coronal and axial T1-weighted   sequences were performed.    This exam is compared with prior noncontrast head CT performed on June 12, 2019.    Area of encephalomalacia and gliosis is seen involving left basal ganglia   region. Ex vacuo dilatation of the anterior body left lateral ventricle   is again seen.    Abnormal T2 prolongation is seen involving the left posterior   frontal/parietal cortex. This likely compatible with an area of gliosis   secondary to an old infarct.    There is no evidence of acute hemorrhage in posterior fossa supratentorial    No mass mass effect seen.    Abnormal susceptibility seen involving the right parasellar region.   Please note the possibility of old hemorrhage, mineralization cavernoma   or underlying aneurysm cannot be entirely excluded. MRA of the [place]   [facility]arcia can be done to better evaluate for underlying aneurysm.     Evaluation of the diffusion weighted sequence demonstrates no abnormal   areas of restricted diffusion to suggest acute infarct.    The visualized paranasal sinuses mastoid and middle ear appear clear.    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    Abnormal susceptibility seen involving the right suprasellar region as   described above.    < end of copied text > Patient is a 44y old  Female who presents with a chief complaint of seizure, AMS (17 Jun 2019 07:51)      SUBJECTIVE / OVERNIGHT EVENTS: No acute events overnight. Patient denies F/C, HA, CP, SOB, abdominal pain, N/V/D/C.    MEDICATIONS  (STANDING):  amLODIPine   Tablet 10 milliGRAM(s) Oral daily  carvedilol 12.5 milliGRAM(s) Oral every 12 hours  lamoTRIgine 25 milliGRAM(s) Oral at bedtime  levETIRAcetam 1000 milliGRAM(s) Oral two times a day  levoFLOXacin  Tablet 750 milliGRAM(s) Oral every 24 hours  lisinopril 5 milliGRAM(s) Oral daily  melatonin 3 milliGRAM(s) Oral at bedtime    MEDICATIONS  (PRN):  acetaminophen   Tablet .. 650 milliGRAM(s) Oral every 6 hours PRN Temp greater or equal to 38C (100.4F), Mild Pain (1 - 3), Moderate Pain (4 - 6), Severe Pain (7 - 10)  benzocaine 15 mG/menthol 3.6 mG (Sugar-Free) Lozenge 1 Lozenge Oral three times a day PRN Sore Throat  guaiFENesin   Syrup  (Sugar-Free) 100 milliGRAM(s) Oral every 6 hours PRN sore throat  LORazepam     Tablet 1 milliGRAM(s) Oral every 6 hours PRN Moderate anxiety  LORazepam     Tablet 2 milliGRAM(s) Oral every 6 hours PRN severe anxiety/agitation  LORazepam   Injectable 2 milliGRAM(s) IntraMuscular every 6 hours PRN severe anxiety/agitation  LORazepam   Injectable 2 milliGRAM(s) IntraMuscular every 6 hours PRN severe anxiety/agitation  LORazepam   Injectable 1 milliGRAM(s) IntraMuscular every 6 hours PRN Moderate anxiety  LORazepam   Injectable 1 milliGRAM(s) IV Push every 6 hours PRN moderate anxiety      T(F): 97.8 (06-18 @ 05:45), Max: 99.1 (06-17 @ 22:18)  HR: 65 (06-18 @ 05:45) (64 - 74)  BP: 151/100 (06-18 @ 05:45) (148/87 - 158/92)  RR: 17 (06-18 @ 05:45) (16 - 17)  SpO2: 100% (06-18 @ 05:45) (99% - 100%)    CAPILLARY BLOOD GLUCOSE        I&O's Summary    17 Jun 2019 07:01  -  18 Jun 2019 07:00  --------------------------------------------------------  IN: 737 mL / OUT: 1250 mL / NET: -513 mL      PHYSICAL EXAM:  GEN: Appears in no acute distress  HEENT: neck supple, no lymphadenopathy, no JVD  PULM: Clear to auscultation bilaterally, no wheezes, rales, rhonchi  CV: Regular rate and rhythm, S1S2, no murmurs, rubs or gallops  ABD: Soft, nontender to palpation, non-distended, normoactive bowel sounds  EXTREMITIES: No edema, + peripheral pulses  NEURO: AAOx3, moving all four extremities spontaneously  SKIN: No rashes, lesions, hematomas, ecchymosis    LABS & IMAGING:  Labs personally reviewed [X]                        12.1   11.18 )-----------( 215      ( 18 Jun 2019 06:10 )             39.3     Hgb Trend: 12.1<--, 12.5<--, 12.4<--, 12.9<--, 15.2<--  WBC Count: 11.18 K/uL (06-18 @ 06:10)  WBC Count: 12.18 K/uL (06-17 @ 06:55)  WBC Count: 14.32 K/uL (06-15 @ 05:20)      06-18    135  |  101  |  12  ----------------------------<  78  4.1   |  21<L>  |  0.91    Ca    9.8      18 Jun 2019 06:10  Phos  3.3     06-18  Mg     1.8     06-18    TPro  6.9  /  Alb  3.7  /  TBili  0.3  /  DBili  x   /  AST  15  /  ALT  12  /  AlkPhos  52  06-18    Creatinine Trend: 0.91<--, 0.82<--, 1.09<--, 1.43<--, 1.08<--    Culture - Blood (06.15.19 @ 11:18)    Culture - Blood:   NO ORGANISMS ISOLATED  NO ORGANISMS ISOLATED AT 48 HRS.    Specimen Source: BLOOD PERIPHERAL    Culture - Blood (06.15.19 @ 11:18)    Culture - Blood:   NO ORGANISMS ISOLATED  NO ORGANISMS ISOLATED AT 48 HRS.    Specimen Source: BLOOD      < from: MR Angio Head No Cont (06.18.19 @ 15:14) >    Impression: Unremarkable MRA of the [place] [facility].    < end of copied text >    CASE DISCUSSED WITH: Dr. Ley (psych): patient can leave AMA
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
## 2019-06-19 NOTE — CHART NOTE - NSCHARTNOTEFT_GEN_A_CORE
Medicine Attending Note:    I spoke with the patient today via her provided cell phone: [phone]. We discussed her current employment as a  and how her having seizures can present a threat to her safety, her passengers, as well as the others on the road. The patient reports that she has told her employer, [employer], regarding her hospitalization and that it was for seizures. Per her, they will not allow her to drive again until she receives clearance from her neurologist.     We further discussed the role for DMV reporting given my concerns for her safety, her passengers, and other vehicles on the road. The patient became tearful during this discussion as she is concerned that she will never be able to drive again and will have no job. We discussed that DMV reporting does not mean her license will be permanently revoked, but rather, that we would be conveying the recommendations of our neurology team that the patient must be seizure-free for one year prior to resumption of driving in addition to her needing the clearance of a neurologist prior to having her license reinstated. We reviewed her seizure medications. I explained that medication compliance and regular follow-up with a neurologist would give her the best chances of becoming seizure-free. She expresses understanding and accepts that I will be reporting her condition to the DMV in the interests of her safety and those around her.     DMV form DS-6 was filled out and sent with a voided Rx per instructions. The form was sent via [employer] #[id number] on 6/19/19.    Leonie Ruffin  [id number]

## 2019-06-20 LAB
BACTERIA BLD CULT: SIGNIFICANT CHANGE UP
BACTERIA BLD CULT: SIGNIFICANT CHANGE UP

## 2019-07-03 NOTE — ED PROCEDURE NOTE - CPROC ED INDICATIONS1
airway protection/respiratory failure/mental status change/critical patient
critical patient/mental status change/respiratory failure/airway protection

## 2019-07-03 NOTE — ED PROCEDURE NOTE - NS ED PROCEUDURE1 POST INTUBATION REVIEW
Breath sounds bilateral/Appropriate capnography/Chest X-Ray
Appropriate capnography/Breath sounds bilateral/Chest X-Ray

## 2019-07-03 NOTE — ED PROCEDURE NOTE - CPROC ED TRACHE INTUB DETAIL1
Patient connected to ventilator with settings as ordered./Patient was pre-oxygenated. An endotracheal tube (ETT) was placed through the vocal cords into the trachea.  ETT position was confirmed by auscultation of bilateral breath sounds to all lung fields. ETCO2 level was appropriate.
Patient was pre-oxygenated. An endotracheal tube (ETT) was placed through the vocal cords into the trachea.  ETT position was confirmed by auscultation of bilateral breath sounds to all lung fields. ETCO2 level was appropriate./Patient connected to ventilator with settings as ordered.

## 2019-10-08 PROBLEM — G40.909 EPILEPSY, UNSPECIFIED, NOT INTRACTABLE, WITHOUT STATUS EPILEPTICUS: Chronic | Status: ACTIVE | Noted: 2019-06-12

## 2019-10-15 ENCOUNTER — APPOINTMENT (OUTPATIENT)
Dept: NEUROLOGY | Facility: CLINIC | Age: 44
End: 2019-10-15

## 2023-06-08 NOTE — PROGRESS NOTE ADULT - PROBLEM SELECTOR PROBLEM 7
show
Need for prophylactic measure

## 2023-10-02 NOTE — PROGRESS NOTE BEHAVIORAL HEALTH - NS ED [facility] MED ROS ENT MOUTH
Hmmm-  Maybe we could try to see what she needs through [facility]?  I attempted to call her number, but as suggested, not answer  I would be okay talking to her as well, but would want the call set up- thanks  
No complaints
No complaints